# Patient Record
Sex: FEMALE | Race: WHITE | NOT HISPANIC OR LATINO | Employment: FULL TIME | URBAN - METROPOLITAN AREA
[De-identification: names, ages, dates, MRNs, and addresses within clinical notes are randomized per-mention and may not be internally consistent; named-entity substitution may affect disease eponyms.]

---

## 2017-04-13 ENCOUNTER — OFFICE VISIT (OUTPATIENT)
Dept: GASTROENTEROLOGY | Facility: CLINIC | Age: 52
End: 2017-04-13
Payer: COMMERCIAL

## 2017-04-13 VITALS
DIASTOLIC BLOOD PRESSURE: 100 MMHG | SYSTOLIC BLOOD PRESSURE: 160 MMHG | BODY MASS INDEX: 27.47 KG/M2 | HEIGHT: 61 IN | WEIGHT: 145.5 LBS | HEART RATE: 90 BPM

## 2017-04-13 DIAGNOSIS — K76.89 LIVER NODULE: Primary | ICD-10-CM

## 2017-04-13 DIAGNOSIS — R10.31 RLQ ABDOMINAL PAIN: ICD-10-CM

## 2017-04-13 PROCEDURE — 99999 PR PBB SHADOW E&M-EST. PATIENT-LVL III: CPT | Mod: PBBFAC,,, | Performed by: NURSE PRACTITIONER

## 2017-04-13 PROCEDURE — 1160F RVW MEDS BY RX/DR IN RCRD: CPT | Mod: S$GLB,,, | Performed by: NURSE PRACTITIONER

## 2017-04-13 PROCEDURE — 99204 OFFICE O/P NEW MOD 45 MIN: CPT | Mod: S$GLB,,, | Performed by: NURSE PRACTITIONER

## 2017-04-13 RX ORDER — OXYCODONE AND ACETAMINOPHEN 5; 325 MG/1; MG/1
1 TABLET ORAL EVERY 4 HOURS PRN
Status: ON HOLD | COMMUNITY
End: 2018-04-14

## 2017-04-13 RX ORDER — ESCITALOPRAM OXALATE 10 MG/1
10 TABLET ORAL DAILY
COMMUNITY

## 2017-04-13 RX ORDER — LEVOTHYROXINE SODIUM 100 UG/1
75 TABLET ORAL
COMMUNITY
Start: 2016-12-05 | End: 2018-05-08

## 2017-04-13 RX ORDER — CELECOXIB 200 MG/1
200 CAPSULE ORAL 2 TIMES DAILY
Status: ON HOLD | COMMUNITY
End: 2018-04-14 | Stop reason: HOSPADM

## 2017-04-13 RX ORDER — TRAMADOL HYDROCHLORIDE 50 MG/1
50 TABLET ORAL
Status: ON HOLD | COMMUNITY
End: 2018-04-14

## 2017-04-13 NOTE — PROGRESS NOTES
Clinic Consult:  [facility] Consultation Note    Reason for Consult:  The primary encounter diagnosis was Liver nodule. A diagnosis of RLQ abdominal pain was also pertinent to this visit.    PCP: Cesar Mccurdy       HPI:  This is a 51 y.o. female here for evaluation of the above  She was recntly evaluated by her PCP for RLQ abdominal pain.  She describes the pain ans sever, sharp pain that is constant throughout the day. No known exacerbating or reliving factors.    Recent workup included labd and imaging.  Records received and reviewed.   Labs are unremarkable with the exception of a slightly elevated bilirubin of 1.5  U/S of the abdomen showed Liver was normal in size and echogenicity.  No focal liver lesions or masses were seen.   A CT abdomen and pelvis were completed and found A small amount of free fluid in the pelvis.  There is a nonspecific 7mm hypodense subtle nodule in the lateral segment left hepatic lobe.   No previous hx of liver disease.  PMH includes breast CA 10 years ago which she has not followed up on in over 2 years.   She also states that she has a BM every few days, but denies any constipation.     Review of Systems   Constitutional: Negative for chills, fever, malaise/fatigue and weight loss.   Respiratory: Negative for cough.    Cardiovascular: Negative for chest pain.   Gastrointestinal:        Per HPI   Musculoskeletal: Negative for myalgias.   Skin: Negative for itching and rash.   Neurological: Negative for headaches.   Psychiatric/Behavioral: The patient is nervous/anxious.        Medical History:   Past Medical History:   Diagnosis Date    Breast cancer        Surgical History:  Past Surgical History:   Procedure Laterality Date    FOOT SURGERY      HYSTERECTOMY      KNEE SURGERY         Family History:   History reviewed. No pertinent family history.    Social History:   Social History   Substance Use Topics    Smoking status: Never Smoker    Smokeless tobacco:  "None    Alcohol use Yes       Allergies: Reviewed    Home Medications:   No current outpatient prescriptions on file prior to visit.     No current facility-administered medications on file prior to visit.        Physical Exam:  Vital Signs:  BP (!) 160/100  Pulse 90  Ht 5' 1" (1.549 m)  Wt 66 kg (145 lb 8.1 oz)  BMI 27.49 kg/m2  Body mass index is 27.49 kg/(m^2).  Physical Exam   Constitutional: She is oriented to person, place, and time. She appears well-developed and well-nourished.   HENT:   Head: Normocephalic.   Eyes: No scleral icterus.   Neck: Normal range of motion.   Cardiovascular: Normal rate and regular rhythm.    Pulmonary/Chest: Effort normal and breath sounds normal.   Abdominal: Soft. Bowel sounds are normal. She exhibits no distension and no mass. There is no tenderness. There is no rebound and no guarding.   Musculoskeletal: Normal range of motion.   Neurological: She is alert and oriented to person, place, and time.   Skin: Skin is warm and dry.   Psychiatric: She has a normal mood and affect.   Vitals reviewed.      Labs: Pertinent labs reviewed.  Last colonoscopy over 10 years ago.  Pt is refusing repeat to evaluate to RLQ abdominal pain further  Assessment:  1. Liver nodule    2. RLQ abdominal pain         Recommendations:  Liver nodule  - Likely cystic in nature, however, will get triple phase CT to further evaluate the area.   - No previous liver disease and labs are WNL  - Not likely the cause of the abdominal pain  - Will have CT reviewed by Dr. Veloz once complete  -     CT Abdomen With Without Contrast; Future; Expected date: 4/13/17    RLQ abdominal pain  - Pt was not willing to have the RLQ discussed or further evaluated at this time.  She became quite defensive at suggestions of possible constipation vs possible colon spasms vs other etiologies, including malignancy within the colon which would need to be evaluated by colonoscopy.     - Offered a prescription for Bentyl or Levsin " to the pt for possible colon spasms, but pt adamantly refused.       Follow up to be determined by results of procedure.        Thank you so much for allowing me to participate in the care of KRISHNA Martinez

## 2017-04-13 NOTE — MR AVS SNAPSHOT
[facility]  [address] [place] [address]  Phone: [phone]  Fax: [phone]                  Diane Laughlin   2017 9:40 AM   Office Visit    Description:  Female : 1965   Provider:  BRYANT Connelly   Department:  [facility]           Reason for Visit     Other           Diagnoses this Visit        Comments    Liver nodule    -  Primary            To Do List           Future Appointments        Provider Department Dept Phone    2017 9:40 AM BRYANT Connelly [facility] [phone]      Goals (5 Years of Data)     None      [facility]     [facility] Care Line -  Assistance  Unless otherwise directed by your provider, please contact [facility], our nurse care line that is available for  assistance.     Registered nurses in the [facility] provide: appointment scheduling, clinical advisement, health education, and other advisory services.  Call: [phone] (toll free)               Medications           Message regarding Medications     Verify the changes and/or additions to your medication regime listed below are the same as discussed with your clinician today.  If any of these changes or additions are incorrect, please notify your healthcare provider.             Verify that the below list of medications is an accurate representation of the medications you are currently taking.  If none reported, the list may be blank. If incorrect, please contact your healthcare provider. Carry this list with you in case of emergency.           Current Medications     celecoxib (CELEBREX) 200 MG capsule Take 200 mg by mouth 2 (two) times daily.    escitalopram oxalate (LEXAPRO) 10 MG tablet Take 10 mg by mouth once daily.    levothyroxine (SYNTHROID) 100 MCG tablet Take 100 mcg by mouth.    oxycodone-acetaminophen (PERCOCET) 5-325 mg per tablet Take 1 tablet by mouth every 4 (four) hours as needed for  "Pain.    tramadol (ULTRAM) 50 mg tablet Take 50 mg by mouth.           Clinical Reference Information           Your Vitals Were     BP Pulse Height Weight BMI    160/100 90 5' 1" (1.549 m) 66 kg (145 lb 8.1 oz) 27.49 kg/m2      Blood Pressure          Most Recent Value    BP  (!)  160/100      Allergies as of 4/13/2017     No Known Allergies      Immunizations Administered on Date of Encounter - 4/13/2017     None      Orders Placed During Today's Visit     Future Labs/Procedures Expected by Expires    CT Abdomen With Without Contrast  4/13/2017 4/14/2018      [facility] Sign-Up     Activating your [facility] account is as easy as 1-2-3!     1) Visit [email or web address], select Sign Up Now, enter this activation code and your date of birth, then select Next.  [email or web address]  Expires: 5/28/2017  9:28 AM      2) Create a username and password to use when you visit [facility] in the future and select a security question in case you lose your password and select Next.    3) Enter your e-mail address and click Sign Up!    Additional Information  If you have questions, please e-mail [email or web address] or call [phone] to talk to our [facility] staff. Remember, [facility] is NOT to be used for urgent needs. For medical emergencies, dial 911.         Language Assistance Services     ATTENTION: Language assistance services are available, free of charge. Please call [phone].      ATENCIÓN: Si habla español, tiene a flowers disposición servicios gratuitos de asistencia lingüística. Llame al [phone].     CHÚ Ý: N?u b?n nói Ti?ng Vi?t, có các d?ch v? h? tr? ngôn ng? mi?n phí dành cho b?n. G?i s? [phone].         [facility] complies with applicable Federal civil rights laws and does not discriminate on the basis of race, color, national origin, age, disability, or sex.        "

## 2018-04-13 ENCOUNTER — HOSPITAL ENCOUNTER (OUTPATIENT)
Facility: HOSPITAL | Age: 53
Discharge: HOME | End: 2018-04-14
Attending: EMERGENCY MEDICINE | Admitting: EMERGENCY MEDICINE
Payer: COMMERCIAL

## 2018-04-13 DIAGNOSIS — K92.1 BLOODY STOOLS: ICD-10-CM

## 2018-04-13 DIAGNOSIS — K52.9 COLITIS: Primary | ICD-10-CM

## 2018-04-13 DIAGNOSIS — R55 SYNCOPE: ICD-10-CM

## 2018-04-13 DIAGNOSIS — E05.00 GRAVES DISEASE: ICD-10-CM

## 2018-04-13 DIAGNOSIS — R55 SYNCOPE, UNSPECIFIED SYNCOPE TYPE: ICD-10-CM

## 2018-04-13 DIAGNOSIS — R55 FAINTING SPELL: ICD-10-CM

## 2018-04-13 DIAGNOSIS — R55 FAINTING EPISODES: ICD-10-CM

## 2018-04-13 PROBLEM — I10 HYPERTENSION: Status: ACTIVE | Noted: 2018-04-13

## 2018-04-13 LAB
ABO + RH BLD: NORMAL
ALBUMIN SERPL BCP-MCNC: 4.4 G/DL
ALP SERPL-CCNC: 77 U/L
ALT SERPL W/O P-5'-P-CCNC: 26 U/L
ANION GAP SERPL CALC-SCNC: 14 MMOL/L
APTT BLDCRRT: 22.8 SEC
AST SERPL-CCNC: 30 U/L
BASOPHILS # BLD AUTO: 0 K/UL
BASOPHILS # BLD AUTO: 0.01 K/UL
BASOPHILS # BLD AUTO: 0.01 K/UL
BASOPHILS NFR BLD: 0 %
BASOPHILS NFR BLD: 0.1 %
BASOPHILS NFR BLD: 0.1 %
BILIRUB SERPL-MCNC: 1 MG/DL
BLD GP AB SCN CELLS X3 SERPL QL: NORMAL
BUN SERPL-MCNC: 15 MG/DL
CALCIUM SERPL-MCNC: 10.3 MG/DL
CHLORIDE SERPL-SCNC: 98 MMOL/L
CO2 SERPL-SCNC: 24 MMOL/L
CREAT SERPL-MCNC: 1.1 MG/DL
DIASTOLIC DYSFUNCTION: NO
DIFFERENTIAL METHOD: ABNORMAL
EOSINOPHIL # BLD AUTO: 0 K/UL
EOSINOPHIL NFR BLD: 0.1 %
EOSINOPHIL NFR BLD: 0.1 %
EOSINOPHIL NFR BLD: 0.2 %
ERYTHROCYTE [DISTWIDTH] IN BLOOD BY AUTOMATED COUNT: 12.2 %
ERYTHROCYTE [DISTWIDTH] IN BLOOD BY AUTOMATED COUNT: 12.3 %
ERYTHROCYTE [DISTWIDTH] IN BLOOD BY AUTOMATED COUNT: 12.7 %
EST. GFR  (AFRICAN AMERICAN): >60 ML/MIN/1.73 M^2
EST. GFR  (NON AFRICAN AMERICAN): 58 ML/MIN/1.73 M^2
GLUCOSE SERPL-MCNC: 167 MG/DL
HCT VFR BLD AUTO: 32.3 %
HCT VFR BLD AUTO: 36.6 %
HCT VFR BLD AUTO: 38.1 %
HCT VFR BLD AUTO: 38.3 %
HGB BLD-MCNC: 10.1 G/DL
HGB BLD-MCNC: 12.6 G/DL
HGB BLD-MCNC: 13.5 G/DL
HGB BLD-MCNC: 13.6 G/DL
INR PPP: 1
LACTATE SERPL-SCNC: 1.8 MMOL/L
LACTATE SERPL-SCNC: 2.6 MMOL/L
LYMPHOCYTES # BLD AUTO: 0.7 K/UL
LYMPHOCYTES # BLD AUTO: 1.6 K/UL
LYMPHOCYTES # BLD AUTO: 1.6 K/UL
LYMPHOCYTES NFR BLD: 12.7 %
LYMPHOCYTES NFR BLD: 18.4 %
LYMPHOCYTES NFR BLD: 7.8 %
MCH RBC QN AUTO: 30.1 PG
MCH RBC QN AUTO: 31.3 PG
MCH RBC QN AUTO: 31.5 PG
MCHC RBC AUTO-ENTMCNC: 31.3 G/DL
MCHC RBC AUTO-ENTMCNC: 35.4 G/DL
MCHC RBC AUTO-ENTMCNC: 35.5 G/DL
MCV RBC AUTO: 88 FL
MCV RBC AUTO: 89 FL
MCV RBC AUTO: 96 FL
MONOCYTES # BLD AUTO: 0.1 K/UL
MONOCYTES # BLD AUTO: 0.4 K/UL
MONOCYTES # BLD AUTO: 0.5 K/UL
MONOCYTES NFR BLD: 1.3 %
MONOCYTES NFR BLD: 4 %
MONOCYTES NFR BLD: 5.2 %
NEUTROPHILS # BLD AUTO: 10.6 K/UL
NEUTROPHILS # BLD AUTO: 6.4 K/UL
NEUTROPHILS # BLD AUTO: 8.5 K/UL
NEUTROPHILS NFR BLD: 76.2 %
NEUTROPHILS NFR BLD: 83.1 %
NEUTROPHILS NFR BLD: 90.7 %
PLATELET # BLD AUTO: 180 K/UL
PLATELET # BLD AUTO: 240 K/UL
PLATELET # BLD AUTO: 241 K/UL
PMV BLD AUTO: 10 FL
PMV BLD AUTO: 10 FL
PMV BLD AUTO: 9.8 FL
POTASSIUM SERPL-SCNC: 3.8 MMOL/L
PROT SERPL-MCNC: 7.5 G/DL
PROTHROMBIN TIME: 10 SEC
RBC # BLD AUTO: 3.35 M/UL
RBC # BLD AUTO: 4.31 M/UL
RBC # BLD AUTO: 4.32 M/UL
RETIRED EF AND QEF - SEE NOTES: 60 (ref 55–65)
SODIUM SERPL-SCNC: 136 MMOL/L
TROPONIN I SERPL DL<=0.01 NG/ML-MCNC: <0.006 NG/ML
WBC # BLD AUTO: 12.75 K/UL
WBC # BLD AUTO: 8.42 K/UL
WBC # BLD AUTO: 9.39 K/UL

## 2018-04-13 PROCEDURE — 83605 ASSAY OF LACTIC ACID: CPT

## 2018-04-13 PROCEDURE — 63600175 PHARM REV CODE 636 W HCPCS: Performed by: EMERGENCY MEDICINE

## 2018-04-13 PROCEDURE — G0378 HOSPITAL OBSERVATION PER HR: HCPCS

## 2018-04-13 PROCEDURE — 84484 ASSAY OF TROPONIN QUANT: CPT

## 2018-04-13 PROCEDURE — 93306 TTE W/DOPPLER COMPLETE: CPT | Mod: 26,,, | Performed by: INTERNAL MEDICINE

## 2018-04-13 PROCEDURE — 25000003 PHARM REV CODE 250: Performed by: EMERGENCY MEDICINE

## 2018-04-13 PROCEDURE — 85014 HEMATOCRIT: CPT | Mod: 91

## 2018-04-13 PROCEDURE — 83605 ASSAY OF LACTIC ACID: CPT | Mod: 91

## 2018-04-13 PROCEDURE — 85018 HEMOGLOBIN: CPT | Mod: 91

## 2018-04-13 PROCEDURE — 25500020 PHARM REV CODE 255: Performed by: EMERGENCY MEDICINE

## 2018-04-13 PROCEDURE — 80053 COMPREHEN METABOLIC PANEL: CPT

## 2018-04-13 PROCEDURE — 96366 THER/PROPH/DIAG IV INF ADDON: CPT

## 2018-04-13 PROCEDURE — 96375 TX/PRO/DX INJ NEW DRUG ADDON: CPT

## 2018-04-13 PROCEDURE — 96365 THER/PROPH/DIAG IV INF INIT: CPT

## 2018-04-13 PROCEDURE — 96367 TX/PROPH/DG ADDL SEQ IV INF: CPT

## 2018-04-13 PROCEDURE — 25000003 PHARM REV CODE 250: Performed by: PHYSICIAN ASSISTANT

## 2018-04-13 PROCEDURE — 86901 BLOOD TYPING SEROLOGIC RH(D): CPT

## 2018-04-13 PROCEDURE — 85730 THROMBOPLASTIN TIME PARTIAL: CPT

## 2018-04-13 PROCEDURE — 93306 TTE W/DOPPLER COMPLETE: CPT

## 2018-04-13 PROCEDURE — 96376 TX/PRO/DX INJ SAME DRUG ADON: CPT | Mod: 59

## 2018-04-13 PROCEDURE — 36415 COLL VENOUS BLD VENIPUNCTURE: CPT

## 2018-04-13 PROCEDURE — 93010 ELECTROCARDIOGRAM REPORT: CPT | Mod: ,,, | Performed by: INTERNAL MEDICINE

## 2018-04-13 PROCEDURE — 63600175 PHARM REV CODE 636 W HCPCS: Performed by: PHYSICIAN ASSISTANT

## 2018-04-13 PROCEDURE — 99285 EMERGENCY DEPT VISIT HI MDM: CPT | Mod: 25

## 2018-04-13 PROCEDURE — S0030 INJECTION, METRONIDAZOLE: HCPCS | Performed by: EMERGENCY MEDICINE

## 2018-04-13 PROCEDURE — 85025 COMPLETE CBC W/AUTO DIFF WBC: CPT | Mod: 91

## 2018-04-13 PROCEDURE — C9113 INJ PANTOPRAZOLE SODIUM, VIA: HCPCS | Performed by: EMERGENCY MEDICINE

## 2018-04-13 PROCEDURE — 85610 PROTHROMBIN TIME: CPT

## 2018-04-13 RX ORDER — MORPHINE SULFATE 4 MG/ML
4 INJECTION, SOLUTION INTRAMUSCULAR; INTRAVENOUS
Status: COMPLETED | OUTPATIENT
Start: 2018-04-13 | End: 2018-04-13

## 2018-04-13 RX ORDER — CIPROFLOXACIN 2 MG/ML
400 INJECTION, SOLUTION INTRAVENOUS
Status: DISCONTINUED | OUTPATIENT
Start: 2018-04-13 | End: 2018-04-14 | Stop reason: HOSPADM

## 2018-04-13 RX ORDER — METRONIDAZOLE 500 MG/100ML
500 INJECTION, SOLUTION INTRAVENOUS
Status: COMPLETED | OUTPATIENT
Start: 2018-04-13 | End: 2018-04-13

## 2018-04-13 RX ORDER — ONDANSETRON 2 MG/ML
4 INJECTION INTRAMUSCULAR; INTRAVENOUS
Status: COMPLETED | OUTPATIENT
Start: 2018-04-13 | End: 2018-04-13

## 2018-04-13 RX ORDER — DEXTROSE MONOHYDRATE AND SODIUM CHLORIDE 5; .9 G/100ML; G/100ML
INJECTION, SOLUTION INTRAVENOUS CONTINUOUS
Status: DISCONTINUED | OUTPATIENT
Start: 2018-04-13 | End: 2018-04-14

## 2018-04-13 RX ORDER — CIPROFLOXACIN 2 MG/ML
400 INJECTION, SOLUTION INTRAVENOUS
Status: DISCONTINUED | OUTPATIENT
Start: 2018-04-13 | End: 2018-04-13 | Stop reason: SDUPTHER

## 2018-04-13 RX ORDER — PANTOPRAZOLE SODIUM 40 MG/1
40 TABLET, DELAYED RELEASE ORAL DAILY
Status: DISCONTINUED | OUTPATIENT
Start: 2018-04-14 | End: 2018-04-14 | Stop reason: HOSPADM

## 2018-04-13 RX ORDER — ONDANSETRON 2 MG/ML
4 INJECTION INTRAMUSCULAR; INTRAVENOUS EVERY 8 HOURS PRN
Status: DISCONTINUED | OUTPATIENT
Start: 2018-04-13 | End: 2018-04-14 | Stop reason: HOSPADM

## 2018-04-13 RX ORDER — METRONIDAZOLE 500 MG/100ML
500 INJECTION, SOLUTION INTRAVENOUS
Status: DISCONTINUED | OUTPATIENT
Start: 2018-04-13 | End: 2018-04-14 | Stop reason: HOSPADM

## 2018-04-13 RX ORDER — LEVOTHYROXINE SODIUM 100 UG/1
100 TABLET ORAL
Status: DISCONTINUED | OUTPATIENT
Start: 2018-04-14 | End: 2018-04-14 | Stop reason: HOSPADM

## 2018-04-13 RX ORDER — MORPHINE SULFATE 4 MG/ML
4 INJECTION, SOLUTION INTRAMUSCULAR; INTRAVENOUS EVERY 4 HOURS PRN
Status: DISCONTINUED | OUTPATIENT
Start: 2018-04-13 | End: 2018-04-13

## 2018-04-13 RX ORDER — HYDRALAZINE HYDROCHLORIDE 20 MG/ML
10 INJECTION INTRAMUSCULAR; INTRAVENOUS EVERY 6 HOURS PRN
Status: DISCONTINUED | OUTPATIENT
Start: 2018-04-13 | End: 2018-04-14 | Stop reason: HOSPADM

## 2018-04-13 RX ORDER — MORPHINE SULFATE 4 MG/ML
2 INJECTION, SOLUTION INTRAMUSCULAR; INTRAVENOUS EVERY 4 HOURS PRN
Status: DISCONTINUED | OUTPATIENT
Start: 2018-04-13 | End: 2018-04-13

## 2018-04-13 RX ORDER — ESCITALOPRAM OXALATE 10 MG/1
10 TABLET ORAL DAILY
Status: DISCONTINUED | OUTPATIENT
Start: 2018-04-13 | End: 2018-04-14 | Stop reason: HOSPADM

## 2018-04-13 RX ORDER — MORPHINE SULFATE 4 MG/ML
2 INJECTION, SOLUTION INTRAMUSCULAR; INTRAVENOUS EVERY 4 HOURS PRN
Status: DISCONTINUED | OUTPATIENT
Start: 2018-04-13 | End: 2018-04-14 | Stop reason: HOSPADM

## 2018-04-13 RX ORDER — ESCITALOPRAM OXALATE 10 MG/1
10 TABLET ORAL DAILY
Status: DISCONTINUED | OUTPATIENT
Start: 2018-04-13 | End: 2018-04-13

## 2018-04-13 RX ORDER — OXYCODONE AND ACETAMINOPHEN 5; 325 MG/1; MG/1
1 TABLET ORAL EVERY 4 HOURS PRN
Status: DISCONTINUED | OUTPATIENT
Start: 2018-04-13 | End: 2018-04-14 | Stop reason: HOSPADM

## 2018-04-13 RX ADMIN — MORPHINE SULFATE 4 MG: 4 INJECTION INTRAVENOUS at 09:04

## 2018-04-13 RX ADMIN — CIPROFLOXACIN 400 MG: 2 INJECTION, SOLUTION INTRAVENOUS at 12:04

## 2018-04-13 RX ADMIN — ESCITALOPRAM OXALATE 10 MG: 10 TABLET, FILM COATED ORAL at 03:04

## 2018-04-13 RX ADMIN — CIPROFLOXACIN 400 MG: 2 INJECTION, SOLUTION INTRAVENOUS at 10:04

## 2018-04-13 RX ADMIN — MORPHINE SULFATE 2 MG: 4 INJECTION INTRAVENOUS at 06:04

## 2018-04-13 RX ADMIN — PANTOPRAZOLE SODIUM 40 MG: 40 INJECTION, POWDER, FOR SOLUTION INTRAVENOUS at 09:04

## 2018-04-13 RX ADMIN — ONDANSETRON 4 MG: 2 INJECTION INTRAMUSCULAR; INTRAVENOUS at 09:04

## 2018-04-13 RX ADMIN — OXYCODONE HYDROCHLORIDE AND ACETAMINOPHEN 1 TABLET: 5; 325 TABLET ORAL at 04:04

## 2018-04-13 RX ADMIN — MORPHINE SULFATE 2 MG: 4 INJECTION INTRAVENOUS at 10:04

## 2018-04-13 RX ADMIN — METRONIDAZOLE 500 MG: 500 INJECTION, SOLUTION INTRAVENOUS at 06:04

## 2018-04-13 RX ADMIN — DEXTROSE AND SODIUM CHLORIDE: 5; .9 INJECTION, SOLUTION INTRAVENOUS at 12:04

## 2018-04-13 RX ADMIN — MORPHINE SULFATE 2 MG: 4 INJECTION INTRAVENOUS at 12:04

## 2018-04-13 RX ADMIN — IOHEXOL 75 ML: 350 INJECTION, SOLUTION INTRAVENOUS at 09:04

## 2018-04-13 RX ADMIN — METRONIDAZOLE 500 MG: 500 INJECTION, SOLUTION INTRAVENOUS at 10:04

## 2018-04-13 NOTE — HPI
Diane Laughlin is a 52 year old female with a history of breast cancer and hypertension who presented to the ED with complaints right sided abdominal pain, diarrhea and bright red blood per rectum that began last night. Additionally, the patient reports having several near syncopal episodes and one episode of syncope while having a bowel movement last night. She states that her right sided abdominal pain is sharp, located in her right lower quadrant and currently rates a 5/10 on a pain scale. She describes  Her diarrhea as initially watery with bright red blood streaks, but later completely red. She has not had a bowel movement since being in the ED. The patient reports that for many months, she has noted occasional bright red blood with bowel movements, but this is different. Her episode of syncope occurred last night while she was having a bowel movement. She reports straining, then being awakened by her dog barking and her daughter coming in the bathroom. She denies prior syncope. She further denies fever, chills, nausea, vomiting and chest pain. The patient reports that her full family history is unknown, but to her knowledge, she does not have a family history of inflammatory bowel disease. In the ED, the patient's CT scan of the abdomen was significant for thickening and pericolonic inflammatory changes are seen throughout the transverse and descending colon. Her WBC count was 9,370. Her hemoglobin and hematocrit were 13.5 and 38.1, respectively. The patient's metabolic panel was unremarkable.

## 2018-04-13 NOTE — ASSESSMENT & PLAN NOTE
-Patient is not on home medications and reports that blood pressure elevation is exacerbated by pain.   -Hydralazine as needed.

## 2018-04-13 NOTE — ED NOTES
"Level of Consciousness: The patient is awake, alert, and oriented with appropriate affect and speech; oriented to person, place and time.  Appearance: Sitting up in bed with no acute distress noted. Clothing and hygiene are clean and worn appropriately.  Skin: Skin is warm and dry with good skin turgor; intact; color consistent with ethnicity.  Mucous membranes are moist.   Musculoskeletal: Moves all extremities well in full range of motion. No obvious deformities or swelling noted.  Respiratory: Airway open and patent, respirations spontaneous, even and unlabored. No accessory muscles in use.   Cardiac: Regular rate and rhythm, good pulses palpated peripherally, capillary refill < 3 seconds.  Abdomen: Soft, non-tender to palpation. No distention noted.  Bruise to right side of abdomen. Pt states RLQ pain with bloody stool since yesterday. Pt states she almost "passed out". Denies LOC. Denies N/V.   Neurologic: PERRLA, face exhibits symmetrical expression, hand grasps equal and even bilaterally, normal sensation noted to all extremities and face when touched by a finger.          "

## 2018-04-13 NOTE — ASSESSMENT & PLAN NOTE
-Likely due to a vasovagal episode.   -Check serial cardiac enzymes, 2D echo, carotid US and MRI of brain as CT scan cannot be performed due to recent IV contrast administration.   -Telemetry monitoring.   -Neurochecks.

## 2018-04-13 NOTE — H&P
[facility] Hospital Medicine  History & Physical    Patient Name: Diane Laughlin  MRN: [id number]  Admission Date: 4/13/2018  Attending Physician: Jf Baeza Jr., MD   Primary Care Provider: Cesar Mccurdy MD         Patient information was obtained from patient, past medical records and ER records.     Subjective:     Principal Problem:Syncope    Chief Complaint:   Chief Complaint   Patient presents with    Melena     reports bright/dark red stools since yesterday, near syncope today. RLQ pain radiates to lower back.         HPI: Diane Laughlin is a 52 year old female with a history of breast cancer and hypertension who presented to the ED with complaints right sided abdominal pain, diarrhea and bright red blood per rectum that began last night. Additionally, the patient reports having several near syncopal episodes and one episode of syncope while having a bowel movement last night. She states that her right sided abdominal pain is sharp, located in her right lower quadrant and currently rates a 5/10 on a pain scale. She describes  Her diarrhea as initially watery with bright red blood streaks, but later completely red. She has not had a bowel movement since being in the ED. The patient reports that for many months, she has noted occasional bright red blood with bowel movements, but this is different. Her episode of syncope occurred last night while she was having a bowel movement. She reports straining, then being awakened by her dog barking and her daughter coming in the bathroom. She denies prior syncope. She further denies fever, chills, nausea, vomiting and chest pain. The patient reports that her full family history is unknown, but to her knowledge, she does not have a family history of inflammatory bowel disease. In the ED, the patient's CT scan of the abdomen was significant for thickening and pericolonic inflammatory changes are seen throughout the transverse and descending colon. Her  "WBC count was 9,370. Her hemoglobin and hematocrit were 13.5 and 38.1, respectively. The patient's metabolic panel was unremarkable.     Past Medical History:   Diagnosis Date    Breast cancer     Chronic back pain     Graves disease     Hypertension        Past Surgical History:   Procedure Laterality Date     SECTION      with complication requiring large midline incision    FOOT SURGERY      HYSTERECTOMY      KNEE SURGERY Left     x 6       Review of patient's allergies indicates:   Allergen Reactions    Unclassified drug      "a chemo medicine'       No current facility-administered medications on file prior to encounter.      Current Outpatient Prescriptions on File Prior to Encounter   Medication Sig    tramadol (ULTRAM) 50 mg tablet Take 50 mg by mouth.    celecoxib (CELEBREX) 200 MG capsule Take 200 mg by mouth 2 (two) times daily.    escitalopram oxalate (LEXAPRO) 10 MG tablet Take 10 mg by mouth once daily.    levothyroxine (SYNTHROID) 100 MCG tablet Take 100 mcg by mouth.    oxycodone-acetaminophen (PERCOCET) 5-325 mg per tablet Take 1 tablet by mouth every 4 (four) hours as needed for Pain.     Family History     Reviewed and not pertinent.         Social History Main Topics    Smoking status: Never Smoker    Smokeless tobacco: Not on file    Alcohol use No    Drug use: No    Sexual activity: Not on file     Review of Systems   Constitutional: Negative for appetite change, chills, diaphoresis, fatigue and fever.   HENT: Negative for congestion, ear pain, mouth sores, sore throat and trouble swallowing.    Eyes: Negative for pain and visual disturbance.   Respiratory: Negative for cough, chest tightness and shortness of breath.    Cardiovascular: Negative for chest pain, palpitations and leg swelling.   Gastrointestinal: Positive for abdominal pain, blood in stool and diarrhea. Negative for constipation and nausea.   Endocrine: Negative for cold intolerance, heat intolerance, " polydipsia and polyuria.   Genitourinary: Negative for dysuria, frequency and hematuria.   Musculoskeletal: Negative for arthralgias, back pain, myalgias and neck pain.   Skin: Negative for pallor, rash and wound.   Allergic/Immunologic: Negative for environmental allergies and immunocompromised state.   Neurological: Negative for dizziness, seizures, syncope, weakness, numbness and headaches.   Hematological: Negative for adenopathy. Does not bruise/bleed easily.   Psychiatric/Behavioral: Negative for agitation, confusion and sleep disturbance.     Objective:     Vital Signs (Most Recent):  Temp: 98.5 °F (36.9 °C) (04/13/18 0758)  Pulse: 87 (04/13/18 0758)  Resp: 18 (04/13/18 0758)  BP: (!) 181/104 (04/13/18 0758)  SpO2: 100 % (04/13/18 0758) Vital Signs (24h Range):  Temp:  [98.5 °F (36.9 °C)] 98.5 °F (36.9 °C)  Pulse:  [87] 87  Resp:  [18] 18  SpO2:  [100 %] 100 %  BP: (181)/(104) 181/104     Weight: 74.1 kg (163 lb 5.8 oz)  Body mass index is 30.87 kg/m².    Physical Exam   Constitutional: She is oriented to person, place, and time. She appears well-developed and well-nourished. No distress.   HENT:   Head: Normocephalic and atraumatic.   Eyes: Conjunctivae are normal.   PERRL   Neck: Neck supple. No JVD present.   Cardiovascular: Normal rate, regular rhythm and normal heart sounds.    Pulmonary/Chest: Effort normal and breath sounds normal.   Abdominal: Soft. Bowel sounds are normal. She exhibits no distension. There is tenderness (right lower quadrant).   Musculoskeletal: Normal range of motion.   Neurological: She is alert and oriented to person, place, and time.   Skin: Skin is warm and dry.   Psychiatric: Her behavior is normal. Thought content normal. Her mood appears anxious.   Nursing note and vitals reviewed.          Significant Labs:   CBC:   Recent Labs  Lab 04/13/18  0832   WBC 9.39   HGB 13.5   HCT 38.1        CMP:   Recent Labs  Lab 04/13/18  0832      K 3.8   CL 98   CO2 24   GLU  167*   BUN 15   CREATININE 1.1   CALCIUM 10.3   PROT 7.5   ALBUMIN 4.4   BILITOT 1.0   ALKPHOS 77   AST 30   ALT 26   ANIONGAP 14   EGFRNONAA 58*     Lactic Acid:   Recent Labs  Lab 04/13/18  1102   LACTATE 2.6*     All pertinent labs within the past 24 hours have been reviewed.    Significant Imaging: I have reviewed all pertinent imaging results/findings within the past 24 hours.    Assessment/Plan:     * Syncope    -Likely due to a vasovagal episode.   -Check serial cardiac enzymes, 2D echo, carotid US and MRI of brain as CT scan cannot be performed due to recent IV contrast administration.   -Telemetry monitoring.   -Neurochecks.             Colitis    -Differential includes infectious, inflammatory and less likely ischemic etiology.   -Trend lactic acid.   -IV fluids.   -Cipro and Flagyl.   -Symptomatic care.           Blood in stool    -Associated with colitis.   -Monitor hemoglobin and hematocrit.           Graves disease    Continue Synthroid.           Hypertension    -Patient is not on home medications and reports that blood pressure elevation is exacerbated by pain.   -Hydralazine as needed.             VTE Risk Mitigation         Ordered     IP VTE HIGH RISK PATIENT  Once      04/13/18 1052     Place ANALI hose  Until discontinued      04/13/18 1052     Place sequential compression device  Until discontinued      04/13/18 1052     Reason for No Pharmacological VTE Prophylaxis  Once      04/13/18 1052             MILO Scott  Department of Hospital Medicine   [facility]

## 2018-04-13 NOTE — SUBJECTIVE & OBJECTIVE
"Past Medical History:   Diagnosis Date    Breast cancer     Chronic back pain     Graves disease     Hypertension        Past Surgical History:   Procedure Laterality Date     SECTION      with complication requiring large midline incision    FOOT SURGERY      HYSTERECTOMY      KNEE SURGERY Left     x 6       Review of patient's allergies indicates:   Allergen Reactions    Unclassified drug      "a chemo medicine'       No current facility-administered medications on file prior to encounter.      Current Outpatient Prescriptions on File Prior to Encounter   Medication Sig    tramadol (ULTRAM) 50 mg tablet Take 50 mg by mouth.    celecoxib (CELEBREX) 200 MG capsule Take 200 mg by mouth 2 (two) times daily.    escitalopram oxalate (LEXAPRO) 10 MG tablet Take 10 mg by mouth once daily.    levothyroxine (SYNTHROID) 100 MCG tablet Take 100 mcg by mouth.    oxycodone-acetaminophen (PERCOCET) 5-325 mg per tablet Take 1 tablet by mouth every 4 (four) hours as needed for Pain.     Family History     None        Social History Main Topics    Smoking status: Never Smoker    Smokeless tobacco: Not on file    Alcohol use No    Drug use: No    Sexual activity: Not on file     Review of Systems   Constitutional: Negative for appetite change, chills, diaphoresis, fatigue and fever.   HENT: Negative for congestion, ear pain, mouth sores, sore throat and trouble swallowing.    Eyes: Negative for pain and visual disturbance.   Respiratory: Negative for cough, chest tightness and shortness of breath.    Cardiovascular: Negative for chest pain, palpitations and leg swelling.   Gastrointestinal: Positive for abdominal pain, blood in stool and diarrhea. Negative for constipation and nausea.   Endocrine: Negative for cold intolerance, heat intolerance, polydipsia and polyuria.   Genitourinary: Negative for dysuria, frequency and hematuria.   Musculoskeletal: Negative for arthralgias, back pain, myalgias and neck " pain.   Skin: Negative for pallor, rash and wound.   Allergic/Immunologic: Negative for environmental allergies and immunocompromised state.   Neurological: Negative for dizziness, seizures, syncope, weakness, numbness and headaches.   Hematological: Negative for adenopathy. Does not bruise/bleed easily.   Psychiatric/Behavioral: Negative for agitation, confusion and sleep disturbance.     Objective:     Vital Signs (Most Recent):  Temp: 98.5 °F (36.9 °C) (04/13/18 0758)  Pulse: 87 (04/13/18 0758)  Resp: 18 (04/13/18 0758)  BP: (!) 181/104 (04/13/18 0758)  SpO2: 100 % (04/13/18 0758) Vital Signs (24h Range):  Temp:  [98.5 °F (36.9 °C)] 98.5 °F (36.9 °C)  Pulse:  [87] 87  Resp:  [18] 18  SpO2:  [100 %] 100 %  BP: (181)/(104) 181/104     Weight: 74.1 kg (163 lb 5.8 oz)  Body mass index is 30.87 kg/m².    Physical Exam   Constitutional: She is oriented to person, place, and time. She appears well-developed and well-nourished. No distress.   HENT:   Head: Normocephalic and atraumatic.   Eyes: Conjunctivae are normal.   PERRL   Neck: Neck supple. No JVD present.   Cardiovascular: Normal rate, regular rhythm and normal heart sounds.    Pulmonary/Chest: Effort normal and breath sounds normal.   Abdominal: Soft. Bowel sounds are normal. She exhibits no distension. There is tenderness (right lower quadrant).   Musculoskeletal: Normal range of motion.   Neurological: She is alert and oriented to person, place, and time.   Skin: Skin is warm and dry.   Psychiatric: Her behavior is normal. Thought content normal. Her mood appears anxious.   Nursing note and vitals reviewed.          Significant Labs:   CBC:   Recent Labs  Lab 04/13/18  0832   WBC 9.39   HGB 13.5   HCT 38.1        CMP:   Recent Labs  Lab 04/13/18  0832      K 3.8   CL 98   CO2 24   *   BUN 15   CREATININE 1.1   CALCIUM 10.3   PROT 7.5   ALBUMIN 4.4   BILITOT 1.0   ALKPHOS 77   AST 30   ALT 26   ANIONGAP 14   EGFRNONAA 58*     Lactic Acid:    Recent Labs  Lab 04/13/18  1102   LACTATE 2.6*     All pertinent labs within the past 24 hours have been reviewed.    Significant Imaging: I have reviewed all pertinent imaging results/findings within the past 24 hours.

## 2018-04-13 NOTE — ASSESSMENT & PLAN NOTE
-Differential includes infectious, inflammatory and less likely ischemic etiology.   -Trend lactic acid.   -IV fluids.   -Cipro and Flagyl.   -Symptomatic care.

## 2018-04-13 NOTE — ED PROVIDER NOTES
"SCRIBE #1 NOTE: I, Sivakumar Harvey, am scribing for, and in the presence of, Jf Baeza Jr., MD. I have scribed the entire note.      History      Chief Complaint   Patient presents with    Melena     reports bright/dark red stools since yesterday, near syncope today. RLQ pain radiates to lower back.        Review of patient's allergies indicates:   Allergen Reactions    Unclassified drug      "a chemo medicine'        HPI   HPI    2018, 8:09 AM   History obtained from the patient      History of Present Illness: Diane Laughlin is a 52 y.o. female patient who presents to the Emergency Department for rectal bleeding which onset gradually last night. Sxs are episodic and moderate in severity. Pt reports passing dark red blood in stool and having bleeding without stool. Pt states that she passed out last night. There are no mitigating or exacerbating factors noted. Associated sxs include RLQ ABD pain, nausea, diaphoresis.  Pt denies any fever, chills, hematuria, vaginal bleeding, CP, emesis, diarrhea, constipation, and all other sxs at this time. No further complaints or concerns at this time.         Arrival mode: Personal vehicle     PCP: Cesar Mccurdy MD       Past Medical History:  Past Medical History:   Diagnosis Date    Breast cancer     Chronic back pain     Graves disease     Hypertension        Past Surgical History:  Past Surgical History:   Procedure Laterality Date     SECTION      with complication requiring large midline incision    FOOT SURGERY      HYSTERECTOMY      KNEE SURGERY Left     x 6         Family History:  History reviewed. No pertinent family history.    Social History:  Social History     Social History Main Topics    Smoking status: Never Smoker    Smokeless tobacco: unknown    Alcohol use Yes    Drug use: Unknown    Sexual activity: unknown       ROS   Review of Systems   Constitutional: Positive for diaphoresis. Negative for fever.   HENT: Negative " for sore throat.    Respiratory: Negative for shortness of breath.    Cardiovascular: Negative for chest pain.   Gastrointestinal: Positive for abdominal pain (RLQ), anal bleeding, blood in stool and nausea. Negative for constipation, diarrhea and vomiting.   Genitourinary: Negative for dysuria, hematuria, vaginal bleeding and vaginal discharge.   Musculoskeletal: Negative for back pain.   Skin: Negative for rash.   Neurological: Positive for syncope. Negative for weakness.   Hematological: Does not bruise/bleed easily.     Physical Exam      Initial Vitals [04/13/18 0758]   BP Pulse Resp Temp SpO2   (!) 181/104 87 18 98.5 °F (36.9 °C) 100 %      MAP       129.67          Physical Exam  Nursing Notes and Vital Signs Reviewed.  Constitutional: Patient is in no acute distress. Well-developed and well-nourished.  Head: Atraumatic. Normocephalic.  Eyes: PERRL. EOM intact. Conjunctivae are not pale. No scleral icterus.  ENT: Mucous membranes are moist. Oropharynx is clear and symmetric.    Neck: Supple. Full ROM. No lymphadenopathy.  Cardiovascular: Regular rate. Regular rhythm. No murmurs, rubs, or gallops. Distal pulses are 2+ and symmetric.  Pulmonary/Chest: No respiratory distress. Clear to auscultation bilaterally. No wheezing or rales.  Abdominal: Soft and non-distended.  There is no tenderness.  No rebound, guarding, or rigidity. Good bowel sounds.  Genitourinary: No CVA tenderness  Rectal:  No tenderness.  No masses.  No hemorrhoids.  Normal sphincter tone.  Stool color is normal.  Musculoskeletal: Moves all extremities. No obvious deformities. No edema. No calf tenderness.  Skin: Warm and dry.  Neurological:  Alert, awake, and appropriate.  Normal speech.  No acute focal neurological deficits are appreciated.  Psychiatric: Normal affect. Good eye contact. Appropriate in content.    ED Course    Procedures  ED Vital Signs:  Vitals:    04/13/18 0758   BP: (!) 181/104   Pulse: 87   Resp: 18   Temp: 98.5 °F (36.9  "°C)   TempSrc: Oral   SpO2: 100%   Weight: 74.1 kg (163 lb 5.8 oz)   Height: 5' 1" (1.549 m)       Abnormal Lab Results:  Labs Reviewed   CBC W/ AUTO DIFFERENTIAL - Abnormal; Notable for the following:        Result Value    MCH 31.3 (*)     Gran # (ANC) 8.5 (*)     Lymph # 0.7 (*)     Mono # 0.1 (*)     Gran% 90.7 (*)     Lymph% 7.8 (*)     Mono% 1.3 (*)     All other components within normal limits   COMPREHENSIVE METABOLIC PANEL - Abnormal; Notable for the following:     Glucose 167 (*)     eGFR if non  58 (*)     All other components within normal limits   LACTIC ACID, PLASMA - Abnormal; Notable for the following:     Lactate (Lactic Acid) 2.6 (*)     All other components within normal limits   PROTIME-INR   APTT   TROPONIN I   CBC W/ AUTO DIFFERENTIAL   CBC W/ AUTO DIFFERENTIAL   LACTIC ACID, PLASMA   TYPE & SCREEN        All Lab Results:  Results for orders placed or performed during the hospital encounter of 04/13/18   CBC auto differential   Result Value Ref Range    WBC 9.39 3.90 - 12.70 K/uL    RBC 4.31 4.00 - 5.40 M/uL    Hemoglobin 13.5 12.0 - 16.0 g/dL    Hematocrit 38.1 37.0 - 48.5 %    MCV 88 82 - 98 fL    MCH 31.3 (H) 27.0 - 31.0 pg    MCHC 35.4 32.0 - 36.0 g/dL    RDW 12.2 11.5 - 14.5 %    Platelets 240 150 - 350 K/uL    MPV 10.0 9.2 - 12.9 fL    Gran # (ANC) 8.5 (H) 1.8 - 7.7 K/uL    Lymph # 0.7 (L) 1.0 - 4.8 K/uL    Mono # 0.1 (L) 0.3 - 1.0 K/uL    Eos # 0.0 0.0 - 0.5 K/uL    Baso # 0.01 0.00 - 0.20 K/uL    Gran% 90.7 (H) 38.0 - 73.0 %    Lymph% 7.8 (L) 18.0 - 48.0 %    Mono% 1.3 (L) 4.0 - 15.0 %    Eosinophil% 0.1 0.0 - 8.0 %    Basophil% 0.1 0.0 - 1.9 %    Differential Method Automated    Comprehensive metabolic panel   Result Value Ref Range    Sodium 136 136 - 145 mmol/L    Potassium 3.8 3.5 - 5.1 mmol/L    Chloride 98 95 - 110 mmol/L    CO2 24 23 - 29 mmol/L    Glucose 167 (H) 70 - 110 mg/dL    BUN, Bld 15 6 - 20 mg/dL    Creatinine 1.1 0.5 - 1.4 mg/dL    Calcium 10.3 8.7 - " 10.5 mg/dL    Total Protein 7.5 6.0 - 8.4 g/dL    Albumin 4.4 3.5 - 5.2 g/dL    Total Bilirubin 1.0 0.1 - 1.0 mg/dL    Alkaline Phosphatase 77 55 - 135 U/L    AST 30 10 - 40 U/L    ALT 26 10 - 44 U/L    Anion Gap 14 8 - 16 mmol/L    eGFR if African American >60 >60 mL/min/1.73 m^2    eGFR if non African American 58 (A) >60 mL/min/1.73 m^2   Protime-INR   Result Value Ref Range    Prothrombin Time 10.0 9.0 - 12.5 sec    INR 1.0 0.8 - 1.2   APTT   Result Value Ref Range    aPTT 22.8 21.0 - 32.0 sec   Troponin I   Result Value Ref Range    Troponin I <0.006 0.000 - 0.026 ng/mL   Lactic acid, plasma   Result Value Ref Range    Lactate (Lactic Acid) 2.6 (H) 0.5 - 2.2 mmol/L   Type & Screen   Result Value Ref Range    Group & Rh A POS     Indirect Peter NEG          Imaging Results:  Imaging Results          US Carotid Bilateral (In process)                CT Abdomen Pelvis With Contrast (Final result)  Result time 04/13/18 10:15:08    Final result by Teja Merrill MD (04/13/18 10:15:08)                 Impression:        Thickening and pericolonic inflammatory changes are seen throughout the transverse and descending colon consistent with infectious or inflammatory colitis.     Mild mucosal hyperemia seen within fluid-filled nondistended ileal loops which can also be seen with enteritis.     All CT scans at this facility use dose modulation, iterative reconstruction and/or weight based dosing when appropriate to reduce radiation dose to as low as reasonably achievable.      Electronically signed by: TEJA MERRILL MD  Date:     04/13/18  Time:    10:15              Narrative:    Clinical data: Abdominal pain.    Axial images through the abdomen and pelvis were obtained  after administration of 75 cc of omni-350 contrast. Coronal and sagittal reformatted images were also submitted for interpretation.    Findings:    The visualized portion of the heart is normal.  No pericardial effusion. The lung bases are clear.  No  pleural effusion.    The liver, gallbladder, biliary system, pancreas, stomach, spleen, adrenal glands are normal.The aorta demonstrates no significant atherosclerotic plaque.No lymphadenopathy.    The kidneys demonstrate normal size, position, contrast excretion with no focal abnormalities or hydronephrosis.The bladder is unremarkable. .    The small bowel demonstrates no evidence of obstruction or ileus. Mild mucosal hyperemia seen within fluid-filled nondistended ileal loops which can also be seen with enteritis. Thickening and pericolonic inflammatory changes are seen throughout the transverse and descending colon consistent with colitis. No evidence of pneumatosis or free air.. The appendix is normal. Changes from ventral hernia repair are seen within the anterior abdominal wall.    Bones appear intact .                             X-Ray Chest AP Portable (Final result)  Result time 04/13/18 08:59:30    Final result by Teja Merrill MD (04/13/18 08:59:30)                 Impression:       No acute process seen.      Electronically signed by: TEJA MERRILL MD  Date:     04/13/18  Time:    08:59              Narrative:    Clinical Data:Syncope    Comparison:  none    Findings:  Single view of the chest.      Cardiac silhouette is normal.  The lungs demonstrate no evidence of active disease.  No evidence of pleural effusion or pneumothorax.  Bones appear intact.                                      The EKG was ordered, reviewed, and independently interpreted by the ED provider.  Interpretation time: 8:23  Rate: 89 BPM  Rhythm: sinus with short DC  Interpretation: Prolonged QT. No STEMI.      The Emergency Provider reviewed the vital signs and test results, which are outlined above.    ED Discussion     10:31 AM: Discussed case with MILO Santana ([facility]). Camryn agrees with current care and management of pt and accepts admission.   Admitting Service: Hospital medicine   Admitting Physician:   Michell   Admit to: telemetry    10:32 AM: Re-evaluated pt. I have discussed test results, shared treatment plan, and the need for admission with patient and family at bedside. Pt and family express understanding at this time and agree with all information. All questions answered. Pt and family have no further questions or concerns at this time. Pt is ready for admit.    ED Medication(s):  Medications   ciprofloxacin (CIPRO)400mg/200ml D5W IVPB 400 mg (not administered)   dextrose 5 % and 0.9 % NaCl infusion (not administered)   morphine injection 2 mg (not administered)   morphine injection 4 mg (not administered)   ondansetron injection 4 mg (not administered)   pantoprazole 40 mg in dextrose 5 % 100 mL infusion (ready to mix system) (not administered)   metronidazole IVPB 500 mg (not administered)   pantoprazole 40 mg in dextrose 5 % 100 mL infusion (ready to mix system) (0 mg Intravenous Stopped 4/13/18 1004)   morphine injection 4 mg (4 mg Intravenous Given 4/13/18 0909)   ondansetron injection 4 mg (4 mg Intravenous Given 4/13/18 0909)   omnipaque 350 iohexol 75 mL (75 mLs Intravenous Given 4/13/18 0953)   metronidazole IVPB 500 mg (500 mg Intravenous New Bag 4/13/18 1051)       New Prescriptions    No medications on file             Medical Decision Making    Medical Decision Making:   Clinical Tests:   Lab Tests: Reviewed and Ordered  Radiological Study: Reviewed and Ordered  Medical Tests: Ordered and Reviewed           Scribe Attestation:   Scribe #1: I performed the above scribed service and the documentation accurately describes the services I performed. I attest to the accuracy of the note.    Attending:   Physician Attestation Statement for Scribe #1: I, Jf Baeza Jr., MD, personally performed the services described in this documentation, as scribed by Sivakumar Harvey, in my presence, and it is both accurate and complete.          Clinical Impression       ICD-10-CM ICD-9-CM   1. Colitis K52.9  558.9   2. Fainting episodes R55 780.2   3. Fainting spell R55 780.2   4. Syncope, unspecified syncope type R55 780.2   5. Bloody stools K92.1 578.1   6. Syncope R55 780.2       Disposition:   Disposition: Admitted  Condition: Cassandra Baeza Jr., MD  04/13/18 6673

## 2018-04-14 VITALS
RESPIRATION RATE: 17 BRPM | TEMPERATURE: 99 F | DIASTOLIC BLOOD PRESSURE: 71 MMHG | WEIGHT: 163.38 LBS | SYSTOLIC BLOOD PRESSURE: 140 MMHG | HEART RATE: 98 BPM | OXYGEN SATURATION: 98 % | HEIGHT: 61 IN | BODY MASS INDEX: 30.85 KG/M2

## 2018-04-14 PROBLEM — I65.29 CAROTID STENOSIS: Status: ACTIVE | Noted: 2018-04-14

## 2018-04-14 PROBLEM — E87.6 HYPOKALEMIA: Status: ACTIVE | Noted: 2018-04-14

## 2018-04-14 LAB
ALBUMIN SERPL BCP-MCNC: 2.8 G/DL
ALP SERPL-CCNC: 57 U/L
ALT SERPL W/O P-5'-P-CCNC: 14 U/L
ANION GAP SERPL CALC-SCNC: 10 MMOL/L
ANION GAP SERPL CALC-SCNC: 10 MMOL/L
AST SERPL-CCNC: 13 U/L
BACTERIA #/AREA URNS HPF: NORMAL /HPF
BASOPHILS # BLD AUTO: 0.01 K/UL
BASOPHILS NFR BLD: 0.2 %
BILIRUB SERPL-MCNC: 0.7 MG/DL
BILIRUB UR QL STRIP: NEGATIVE
BUN SERPL-MCNC: 5 MG/DL
BUN SERPL-MCNC: 5 MG/DL
CALCIUM SERPL-MCNC: 7.1 MG/DL
CALCIUM SERPL-MCNC: 7.1 MG/DL
CHLORIDE SERPL-SCNC: 107 MMOL/L
CHLORIDE SERPL-SCNC: 107 MMOL/L
CLARITY UR: CLEAR
CO2 SERPL-SCNC: 24 MMOL/L
CO2 SERPL-SCNC: 24 MMOL/L
COLOR UR: YELLOW
CREAT SERPL-MCNC: 0.8 MG/DL
CREAT SERPL-MCNC: 0.8 MG/DL
DIFFERENTIAL METHOD: ABNORMAL
EOSINOPHIL # BLD AUTO: 0.1 K/UL
EOSINOPHIL NFR BLD: 1.1 %
ERYTHROCYTE [DISTWIDTH] IN BLOOD BY AUTOMATED COUNT: 12.7 %
EST. GFR  (AFRICAN AMERICAN): >60 ML/MIN/1.73 M^2
EST. GFR  (AFRICAN AMERICAN): >60 ML/MIN/1.73 M^2
EST. GFR  (NON AFRICAN AMERICAN): >60 ML/MIN/1.73 M^2
EST. GFR  (NON AFRICAN AMERICAN): >60 ML/MIN/1.73 M^2
GLUCOSE SERPL-MCNC: 102 MG/DL
GLUCOSE SERPL-MCNC: 102 MG/DL
GLUCOSE UR QL STRIP: NEGATIVE
HCT VFR BLD AUTO: 29.5 %
HCT VFR BLD AUTO: 31.2 %
HCT VFR BLD AUTO: 35.8 %
HCT VFR BLD AUTO: 35.9 %
HGB BLD-MCNC: 10.5 G/DL
HGB BLD-MCNC: 12 G/DL
HGB BLD-MCNC: 12.1 G/DL
HGB BLD-MCNC: 12.1 G/DL
HGB BLD-MCNC: 8.6 G/DL
HGB UR QL STRIP: NEGATIVE
KETONES UR QL STRIP: NEGATIVE
LEUKOCYTE ESTERASE UR QL STRIP: ABNORMAL
LYMPHOCYTES # BLD AUTO: 1.8 K/UL
LYMPHOCYTES NFR BLD: 28.3 %
MAGNESIUM SERPL-MCNC: 1.4 MG/DL
MCH RBC QN AUTO: 31 PG
MCHC RBC AUTO-ENTMCNC: 33.7 G/DL
MCV RBC AUTO: 92 FL
MICROSCOPIC COMMENT: NORMAL
MONOCYTES # BLD AUTO: 0.5 K/UL
MONOCYTES NFR BLD: 8.4 %
NEUTROPHILS # BLD AUTO: 3.9 K/UL
NEUTROPHILS NFR BLD: 62 %
NITRITE UR QL STRIP: NEGATIVE
PH UR STRIP: 6 [PH] (ref 5–8)
PHOSPHATE SERPL-MCNC: 4.4 MG/DL
PLATELET # BLD AUTO: 162 K/UL
PMV BLD AUTO: 9.7 FL
POTASSIUM SERPL-SCNC: 2.7 MMOL/L
POTASSIUM SERPL-SCNC: 5.1 MMOL/L
PROT SERPL-MCNC: 4.9 G/DL
PROT UR QL STRIP: NEGATIVE
RBC # BLD AUTO: 3.39 M/UL
SODIUM SERPL-SCNC: 141 MMOL/L
SODIUM SERPL-SCNC: 141 MMOL/L
SP GR UR STRIP: 1.02 (ref 1–1.03)
SQUAMOUS #/AREA URNS HPF: 1 /HPF
URN SPEC COLLECT METH UR: ABNORMAL
UROBILINOGEN UR STRIP-ACNC: NEGATIVE EU/DL
WBC # BLD AUTO: 6.3 K/UL
WBC #/AREA URNS HPF: 2 /HPF (ref 0–5)

## 2018-04-14 PROCEDURE — G0378 HOSPITAL OBSERVATION PER HR: HCPCS

## 2018-04-14 PROCEDURE — 84100 ASSAY OF PHOSPHORUS: CPT

## 2018-04-14 PROCEDURE — 96375 TX/PRO/DX INJ NEW DRUG ADDON: CPT

## 2018-04-14 PROCEDURE — 80053 COMPREHEN METABOLIC PANEL: CPT

## 2018-04-14 PROCEDURE — 96366 THER/PROPH/DIAG IV INF ADDON: CPT

## 2018-04-14 PROCEDURE — 85018 HEMOGLOBIN: CPT | Mod: 91

## 2018-04-14 PROCEDURE — 83735 ASSAY OF MAGNESIUM: CPT

## 2018-04-14 PROCEDURE — 85014 HEMATOCRIT: CPT

## 2018-04-14 PROCEDURE — 85018 HEMOGLOBIN: CPT

## 2018-04-14 PROCEDURE — 25000003 PHARM REV CODE 250: Performed by: PHYSICIAN ASSISTANT

## 2018-04-14 PROCEDURE — 84132 ASSAY OF SERUM POTASSIUM: CPT

## 2018-04-14 PROCEDURE — S0030 INJECTION, METRONIDAZOLE: HCPCS | Performed by: EMERGENCY MEDICINE

## 2018-04-14 PROCEDURE — 25000003 PHARM REV CODE 250: Performed by: EMERGENCY MEDICINE

## 2018-04-14 PROCEDURE — 81000 URINALYSIS NONAUTO W/SCOPE: CPT

## 2018-04-14 PROCEDURE — 25000003 PHARM REV CODE 250: Performed by: NURSE PRACTITIONER

## 2018-04-14 PROCEDURE — 96367 TX/PROPH/DG ADDL SEQ IV INF: CPT

## 2018-04-14 PROCEDURE — 63600175 PHARM REV CODE 636 W HCPCS: Performed by: EMERGENCY MEDICINE

## 2018-04-14 PROCEDURE — 63600175 PHARM REV CODE 636 W HCPCS: Performed by: PHYSICIAN ASSISTANT

## 2018-04-14 PROCEDURE — 36415 COLL VENOUS BLD VENIPUNCTURE: CPT

## 2018-04-14 PROCEDURE — 96376 TX/PRO/DX INJ SAME DRUG ADON: CPT

## 2018-04-14 PROCEDURE — 85025 COMPLETE CBC W/AUTO DIFF WBC: CPT

## 2018-04-14 PROCEDURE — 96365 THER/PROPH/DIAG IV INF INIT: CPT

## 2018-04-14 PROCEDURE — 63600175 PHARM REV CODE 636 W HCPCS: Performed by: NURSE PRACTITIONER

## 2018-04-14 RX ORDER — POTASSIUM CHLORIDE 20 MEQ/15ML
40 SOLUTION ORAL ONCE
Status: COMPLETED | OUTPATIENT
Start: 2018-04-14 | End: 2018-04-14

## 2018-04-14 RX ORDER — METRONIDAZOLE 500 MG/1
500 TABLET ORAL EVERY 12 HOURS
Qty: 14 TABLET | Refills: 0 | Status: SHIPPED | OUTPATIENT
Start: 2018-04-14 | End: 2018-04-21

## 2018-04-14 RX ORDER — POTASSIUM CHLORIDE 20 MEQ/1
60 TABLET, EXTENDED RELEASE ORAL ONCE
Status: COMPLETED | OUTPATIENT
Start: 2018-04-14 | End: 2018-04-14

## 2018-04-14 RX ORDER — METRONIDAZOLE 500 MG/1
500 TABLET ORAL EVERY 12 HOURS
Qty: 14 TABLET | Refills: 0 | Status: SHIPPED | OUTPATIENT
Start: 2018-04-14 | End: 2018-04-14

## 2018-04-14 RX ORDER — POTASSIUM CHLORIDE 20 MEQ/1
40 TABLET, EXTENDED RELEASE ORAL ONCE
Status: COMPLETED | OUTPATIENT
Start: 2018-04-14 | End: 2018-04-14

## 2018-04-14 RX ORDER — TRAMADOL HYDROCHLORIDE 50 MG/1
50 TABLET ORAL EVERY 6 HOURS PRN
Qty: 15 TABLET | Refills: 0 | Status: SHIPPED | OUTPATIENT
Start: 2018-04-14

## 2018-04-14 RX ORDER — POTASSIUM CHLORIDE 20 MEQ/1
40 TABLET, EXTENDED RELEASE ORAL ONCE
Status: DISCONTINUED | OUTPATIENT
Start: 2018-04-14 | End: 2018-04-14

## 2018-04-14 RX ORDER — CIPROFLOXACIN 500 MG/1
500 TABLET ORAL EVERY 12 HOURS
Qty: 14 TABLET | Refills: 0 | Status: SHIPPED | OUTPATIENT
Start: 2018-04-14 | End: 2018-04-14

## 2018-04-14 RX ORDER — CIPROFLOXACIN 500 MG/1
500 TABLET ORAL EVERY 12 HOURS
Qty: 14 TABLET | Refills: 0 | Status: SHIPPED | OUTPATIENT
Start: 2018-04-14 | End: 2018-04-21

## 2018-04-14 RX ADMIN — POTASSIUM CHLORIDE 60 MEQ: 1500 TABLET, EXTENDED RELEASE ORAL at 08:04

## 2018-04-14 RX ADMIN — METRONIDAZOLE 500 MG: 500 INJECTION, SOLUTION INTRAVENOUS at 04:04

## 2018-04-14 RX ADMIN — CIPROFLOXACIN 400 MG: 2 INJECTION, SOLUTION INTRAVENOUS at 11:04

## 2018-04-14 RX ADMIN — LEVOTHYROXINE SODIUM 100 MCG: 100 TABLET ORAL at 05:04

## 2018-04-14 RX ADMIN — POTASSIUM CHLORIDE 40 MEQ: 1500 TABLET, EXTENDED RELEASE ORAL at 11:04

## 2018-04-14 RX ADMIN — POTASSIUM CHLORIDE 40 MEQ: 20 SOLUTION ORAL at 02:04

## 2018-04-14 RX ADMIN — PANTOPRAZOLE SODIUM 40 MG: 40 TABLET, DELAYED RELEASE ORAL at 08:04

## 2018-04-14 RX ADMIN — METRONIDAZOLE 500 MG: 500 INJECTION, SOLUTION INTRAVENOUS at 12:04

## 2018-04-14 RX ADMIN — OXYCODONE HYDROCHLORIDE AND ACETAMINOPHEN 1 TABLET: 5; 325 TABLET ORAL at 01:04

## 2018-04-14 RX ADMIN — OXYCODONE HYDROCHLORIDE AND ACETAMINOPHEN 1 TABLET: 5; 325 TABLET ORAL at 04:04

## 2018-04-14 RX ADMIN — OXYCODONE HYDROCHLORIDE AND ACETAMINOPHEN 1 TABLET: 5; 325 TABLET ORAL at 08:04

## 2018-04-14 RX ADMIN — MAGNESIUM SULFATE HEPTAHYDRATE 3 G: 500 INJECTION, SOLUTION INTRAMUSCULAR; INTRAVENOUS at 10:04

## 2018-04-14 RX ADMIN — MORPHINE SULFATE 2 MG: 4 INJECTION INTRAVENOUS at 02:04

## 2018-04-14 RX ADMIN — ESCITALOPRAM OXALATE 10 MG: 10 TABLET, FILM COATED ORAL at 08:04

## 2018-04-14 RX ADMIN — DEXTROSE AND SODIUM CHLORIDE: 5; .9 INJECTION, SOLUTION INTRAVENOUS at 04:04

## 2018-04-14 RX ADMIN — OXYCODONE HYDROCHLORIDE AND ACETAMINOPHEN 1 TABLET: 5; 325 TABLET ORAL at 12:04

## 2018-04-14 NOTE — ASSESSMENT & PLAN NOTE
Secondary to  to a vasovagal episode.   Pt does have carotid stenosis which will need to be followed by Cardiology

## 2018-04-14 NOTE — PLAN OF CARE
Problem: Patient Care Overview  Goal: Plan of Care Review  Outcome: Ongoing (interventions implemented as appropriate)  POC discussed w/patient, verbalized understanding. NSR/ST on monitor. VSS. Voids per BRP. PT c/o pain relieved with morphine and percocet. Pt has two bloody stool NP made aware. IV atx and electrolyte replacement administered. Frequent weight change encouraged. Patient turns independently in bed. Fall precautions in place, bed alarm on. Patient denies needs at this time.

## 2018-04-14 NOTE — SUBJECTIVE & OBJECTIVE
Review of Systems   Constitutional: Negative for appetite change, chills, diaphoresis, fatigue, fever and unexpected weight change.   HENT: Negative for congestion, nosebleeds, sinus pressure and sore throat.    Eyes: Negative for pain, discharge and visual disturbance.   Respiratory: Negative for cough, chest tightness, shortness of breath, wheezing and stridor.    Cardiovascular: Negative for chest pain, palpitations and leg swelling.   Gastrointestinal: Negative for abdominal distention, abdominal pain, blood in stool, constipation, diarrhea, nausea and vomiting.   Endocrine: Negative for cold intolerance and heat intolerance.   Genitourinary: Negative for difficulty urinating, dysuria, flank pain, frequency and urgency.   Musculoskeletal: Negative for arthralgias, back pain, joint swelling, myalgias, neck pain and neck stiffness.   Skin: Negative for rash and wound.   Allergic/Immunologic: Negative for food allergies and immunocompromised state.   Neurological: Negative for dizziness, seizures, syncope, facial asymmetry, speech difficulty, weakness, light-headedness, numbness and headaches.   Hematological: Negative for adenopathy.   Psychiatric/Behavioral: Negative for agitation, confusion and hallucinations.     Objective:     Vital Signs (Most Recent):  Temp: 98.2 °F (36.8 °C) (04/14/18 1144)  Pulse: 81 (04/14/18 1144)  Resp: 17 (04/14/18 1144)  BP: (!) 155/88 (04/14/18 1144)  SpO2: 96 % (04/14/18 1144) Vital Signs (24h Range):  Temp:  [97.8 °F (36.6 °C)-99.3 °F (37.4 °C)] 98.2 °F (36.8 °C)  Pulse:  [] 81  Resp:  [17-18] 17  SpO2:  [96 %-100 %] 96 %  BP: ()/(59-88) 155/88     Weight: 74.1 kg (163 lb 5.8 oz)  Body mass index is 30.87 kg/m².  No intake or output data in the 24 hours ending 04/14/18 1450   Physical Exam   Constitutional: She is oriented to person, place, and time. She appears well-developed and well-nourished. No distress.   HENT:   Head: Normocephalic and atraumatic.   Nose:  Nose normal.   Mouth/Throat: Oropharynx is clear and moist.   Eyes: Conjunctivae and EOM are normal. No scleral icterus.   Neck: Normal range of motion. Neck supple. No tracheal deviation present.   Cardiovascular: Normal rate, regular rhythm, normal heart sounds and intact distal pulses.  Exam reveals no gallop and no friction rub.    No murmur heard.  Pulmonary/Chest: Effort normal and breath sounds normal. No stridor. No respiratory distress. She has no wheezes. She has no rales. She exhibits no tenderness.   Abdominal: Soft. Bowel sounds are normal. She exhibits no distension and no mass. There is no tenderness. There is no rebound and no guarding.   Musculoskeletal: Normal range of motion. She exhibits no edema, tenderness or deformity.   Neurological: She is alert and oriented to person, place, and time. No cranial nerve deficit. She exhibits normal muscle tone. Coordination normal.   Skin: Skin is warm and dry. No rash noted. She is not diaphoretic. No erythema. No pallor.   Psychiatric: She has a normal mood and affect. Her behavior is normal. Thought content normal.       Significant Labs:   BMP:   Recent Labs  Lab 04/14/18  0441 04/14/18  1244     102  --      141  --    K 2.7*  2.7* 2.7*     107  --    CO2 24  24  --    BUN 5*  5*  --    CREATININE 0.8  0.8  --    CALCIUM 7.1*  7.1*  --    MG 1.4*  --      CBC:   Recent Labs  Lab 04/13/18  1232 04/13/18  1525  04/13/18  2350 04/14/18 0441 04/14/18  0903   WBC 12.75* 8.42  --   --  6.30  --    HGB 13.6 10.1*  < > 12.1 10.5* 12.1   HCT 38.3 32.3*  < > 35.9* 31.2* 35.8*    180  --   --  162  --    < > = values in this interval not displayed.  CMP:   Recent Labs  Lab 04/13/18  0832 04/14/18 0441 04/14/18  1244    141  141  --    K 3.8 2.7*  2.7* 2.7*   CL 98 107  107  --    CO2 24 24  24  --    * 102  102  --    BUN 15 5*  5*  --    CREATININE 1.1 0.8  0.8  --    CALCIUM 10.3 7.1*  7.1*  --    PROT 7.5  4.9*  --    ALBUMIN 4.4 2.8*  --    BILITOT 1.0 0.7  --    ALKPHOS 77 57  --    AST 30 13  --    ALT 26 14  --    ANIONGAP 14 10  10  --    EGFRNONAA 58* >60  >60  --      All pertinent labs within the past 24 hours have been reviewed.    Significant Imaging:   Imaging Results          MRI Brain Without Contrast (Final result)  Result time 04/13/18 13:47:41    Final result by Paresh Rosales MD (04/13/18 13:47:41)                 Impression:      1.  No evidence for acute CVA.  2.  No acute intracranial process.  3.  Mild chronic small vessel ischemic change of the white matter.      Electronically signed by: PARESH ROSALES MD  Date:     04/13/18  Time:    13:47              Narrative:    Head MRI without intravenous contrast    Indication: Syncope.    Technique: Multiplanar multisequence imaging of the head is performed without intravenous contrast.    Findings: The diffusion scan shows no restricted diffusion.  Sagittal sequence shows normal midline structures.  No mass lesion or extra-axial fluid collection or cerebral edema.  There is mild chronic small vessel ischemic change the white matter.  No midline shift or hydrocephalus.  Normal arterial flow voids are seen.                             US Carotid Bilateral (Final result)  Result time 04/13/18 12:21:09    Final result by Rocael Malik MD (04/13/18 12:21:09)                 Impression:      Elevated velocity within the left proximal common carotid artery.  percent stenosis in this region 50-69%..    Minimal plaque demonstrated within bilateral proximal internal carotid arteries and the left common carotid artery.  Percent stenosis within these regions less than 50%.    Stenosis of 50-69% - validated velocity measurements with angiographic measurements, velocity criteria are extrapolated from diameter data as defined by the Society of Radiologists in Ultrasound Consensus Conference Radiology 2003; 229;340-346.      Electronically signed by: ROCAEL  CHESTER REDDY  Date:     04/13/18  Time:    12:21              Narrative:    EXAM: Carotid arterial ultrasound    CLINICAL HISTORY: Syncope.  Unspecified syncope type.    Comparison: None.    FINDINGS:  Grey scale sonography and color doppler and spectral imaging performed of the cervical portions of the carotid arteries.    Right Side:      Velocities: Non-elevated velocities..  ICA/CCA Ratios: systolic 0.6. diastolic 0.9.  Plaque: Minimal plaque right proximal internal carotid artery..  Flow: Antegrade.    Right Vertebral artery: antegrade flow.    Left Side:    Velocities: Elevated velocity proximal common carotid artery 156/12 cm/s.  Otherwise non-elevated velocities..  ICA/CCA Ratios: systolic 0.3. diastolic 1.4.  Plaque: Minimal plaque common carotid artery and proximal ICA..  Flow: antegrade.    Left Vertebral Artery: antegrade flow.                             CT Abdomen Pelvis With Contrast (Final result)  Result time 04/13/18 10:15:08    Final result by Teja Merrill MD (04/13/18 10:15:08)                 Impression:        Thickening and pericolonic inflammatory changes are seen throughout the transverse and descending colon consistent with infectious or inflammatory colitis.     Mild mucosal hyperemia seen within fluid-filled nondistended ileal loops which can also be seen with enteritis.     All CT scans at this facility use dose modulation, iterative reconstruction and/or weight based dosing when appropriate to reduce radiation dose to as low as reasonably achievable.      Electronically signed by: TEJA MERRILL MD  Date:     04/13/18  Time:    10:15              Narrative:    Clinical data: Abdominal pain.    Axial images through the abdomen and pelvis were obtained  after administration of 75 cc of omni-350 contrast. Coronal and sagittal reformatted images were also submitted for interpretation.    Findings:    The visualized portion of the heart is normal.  No pericardial effusion. The lung bases  are clear.  No pleural effusion.    The liver, gallbladder, biliary system, pancreas, stomach, spleen, adrenal glands are normal.The aorta demonstrates no significant atherosclerotic plaque.No lymphadenopathy.    The kidneys demonstrate normal size, position, contrast excretion with no focal abnormalities or hydronephrosis.The bladder is unremarkable. .    The small bowel demonstrates no evidence of obstruction or ileus. Mild mucosal hyperemia seen within fluid-filled nondistended ileal loops which can also be seen with enteritis. Thickening and pericolonic inflammatory changes are seen throughout the transverse and descending colon consistent with colitis. No evidence of pneumatosis or free air.. The appendix is normal. Changes from ventral hernia repair are seen within the anterior abdominal wall.    Bones appear intact .                             X-Ray Chest AP Portable (Final result)  Result time 04/13/18 08:59:30    Final result by Teja Merrill MD (04/13/18 08:59:30)                 Impression:       No acute process seen.      Electronically signed by: TEJA MERRILL MD  Date:     04/13/18  Time:    08:59              Narrative:    Clinical Data:Syncope    Comparison:  none    Findings:  Single view of the chest.      Cardiac silhouette is normal.  The lungs demonstrate no evidence of active disease.  No evidence of pleural effusion or pneumothorax.  Bones appear intact.

## 2018-04-14 NOTE — PROGRESS NOTES
[facility] Hospital Medicine  Progress Note    Patient Name: Diane Laughlin  MRN: [id number]  Patient Class: OP- Observation   Admission Date: 4/13/2018  Length of Stay: 0 days  Attending Physician: Negrita Galarza MD  Primary Care Provider: Cesar Mccurdy MD        Subjective:     Principal Problem:Syncope    HPI:  Diane Laughlin is a 52 year old female with a history of breast cancer and hypertension who presented to the ED with complaints right sided abdominal pain, diarrhea and bright red blood per rectum that began last night. Additionally, the patient reports having several near syncopal episodes and one episode of syncope while having a bowel movement last night. She states that her right sided abdominal pain is sharp, located in her right lower quadrant and currently rates a 5/10 on a pain scale. She describes  Her diarrhea as initially watery with bright red blood streaks, but later completely red. She has not had a bowel movement since being in the ED. The patient reports that for many months, she has noted occasional bright red blood with bowel movements, but this is different. Her episode of syncope occurred last night while she was having a bowel movement. She reports straining, then being awakened by her dog barking and her daughter coming in the bathroom. She denies prior syncope. She further denies fever, chills, nausea, vomiting and chest pain. The patient reports that her full family history is unknown, but to her knowledge, she does not have a family history of inflammatory bowel disease. In the ED, the patient's CT scan of the abdomen was significant for thickening and pericolonic inflammatory changes are seen throughout the transverse and descending colon. Her WBC count was 9,370. Her hemoglobin and hematocrit were 13.5 and 38.1, respectively. The patient's metabolic panel was unremarkable.     Hospital Course:  The pt was placed in observation for syncope, rectal bleeding, lactic  acidosis and colitis. Troponin normal. Carotid U/S showed 50-69% left ICA stenosis and less than 50% right ICA stenosis. Cardiac echo was normal. No orthostasis. MRI brain was normal -MRI done since CT head could not be done due to recent IV contrast. Syncope occurred while pt was on the toilet bearing down and having abdominal cramping- Likely vaso-vagal episode.   The pt's lactic acidosis resolved. Abdominal pain, diarrhea, and rectal bleeding resolved. H/H remained stable. Pt reports abdominal pain and diarrhea on/off for several years. Instructed pt to follow up closely with GI.   Pt noted to have hypokalemia. Repeat potassium was still low at 2.7. Will replete and recheck.         Review of Systems   Constitutional: Negative for appetite change, chills, diaphoresis, fatigue, fever and unexpected weight change.   HENT: Negative for congestion, nosebleeds, sinus pressure and sore throat.    Eyes: Negative for pain, discharge and visual disturbance.   Respiratory: Negative for cough, chest tightness, shortness of breath, wheezing and stridor.    Cardiovascular: Negative for chest pain, palpitations and leg swelling.   Gastrointestinal: Negative for abdominal distention, abdominal pain, blood in stool, constipation, diarrhea, nausea and vomiting.   Endocrine: Negative for cold intolerance and heat intolerance.   Genitourinary: Negative for difficulty urinating, dysuria, flank pain, frequency and urgency.   Musculoskeletal: Negative for arthralgias, back pain, joint swelling, myalgias, neck pain and neck stiffness.   Skin: Negative for rash and wound.   Allergic/Immunologic: Negative for food allergies and immunocompromised state.   Neurological: Negative for dizziness, seizures, syncope, facial asymmetry, speech difficulty, weakness, light-headedness, numbness and headaches.   Hematological: Negative for adenopathy.   Psychiatric/Behavioral: Negative for agitation, confusion and hallucinations.     Objective:      Vital Signs (Most Recent):  Temp: 98.2 °F (36.8 °C) (04/14/18 1144)  Pulse: 81 (04/14/18 1144)  Resp: 17 (04/14/18 1144)  BP: (!) 155/88 (04/14/18 1144)  SpO2: 96 % (04/14/18 1144) Vital Signs (24h Range):  Temp:  [97.8 °F (36.6 °C)-99.3 °F (37.4 °C)] 98.2 °F (36.8 °C)  Pulse:  [] 81  Resp:  [17-18] 17  SpO2:  [96 %-100 %] 96 %  BP: ()/(59-88) 155/88     Weight: 74.1 kg (163 lb 5.8 oz)  Body mass index is 30.87 kg/m².  No intake or output data in the 24 hours ending 04/14/18 1450   Physical Exam   Constitutional: She is oriented to person, place, and time. She appears well-developed and well-nourished. No distress.   HENT:   Head: Normocephalic and atraumatic.   Nose: Nose normal.   Mouth/Throat: Oropharynx is clear and moist.   Eyes: Conjunctivae and EOM are normal. No scleral icterus.   Neck: Normal range of motion. Neck supple. No tracheal deviation present.   Cardiovascular: Normal rate, regular rhythm, normal heart sounds and intact distal pulses.  Exam reveals no gallop and no friction rub.    No murmur heard.  Pulmonary/Chest: Effort normal and breath sounds normal. No stridor. No respiratory distress. She has no wheezes. She has no rales. She exhibits no tenderness.   Abdominal: Soft. Bowel sounds are normal. She exhibits no distension and no mass. There is no tenderness. There is no rebound and no guarding.   Musculoskeletal: Normal range of motion. She exhibits no edema, tenderness or deformity.   Neurological: She is alert and oriented to person, place, and time. No cranial nerve deficit. She exhibits normal muscle tone. Coordination normal.   Skin: Skin is warm and dry. No rash noted. She is not diaphoretic. No erythema. No pallor.   Psychiatric: She has a normal mood and affect. Her behavior is normal. Thought content normal.       Significant Labs:   BMP:   Recent Labs  Lab 04/14/18  0441 04/14/18  1244     102  --      141  --    K 2.7*  2.7* 2.7*     107  --     CO2 24  24  --    BUN 5*  5*  --    CREATININE 0.8  0.8  --    CALCIUM 7.1*  7.1*  --    MG 1.4*  --      CBC:   Recent Labs  Lab 04/13/18  1232 04/13/18  1525  04/13/18  2350 04/14/18  0441 04/14/18  0903   WBC 12.75* 8.42  --   --  6.30  --    HGB 13.6 10.1*  < > 12.1 10.5* 12.1   HCT 38.3 32.3*  < > 35.9* 31.2* 35.8*    180  --   --  162  --    < > = values in this interval not displayed.  CMP:   Recent Labs  Lab 04/13/18  0832 04/14/18  0441 04/14/18  1244    141  141  --    K 3.8 2.7*  2.7* 2.7*   CL 98 107  107  --    CO2 24 24  24  --    * 102  102  --    BUN 15 5*  5*  --    CREATININE 1.1 0.8  0.8  --    CALCIUM 10.3 7.1*  7.1*  --    PROT 7.5 4.9*  --    ALBUMIN 4.4 2.8*  --    BILITOT 1.0 0.7  --    ALKPHOS 77 57  --    AST 30 13  --    ALT 26 14  --    ANIONGAP 14 10  10  --    EGFRNONAA 58* >60  >60  --      All pertinent labs within the past 24 hours have been reviewed.    Significant Imaging:   Imaging Results          MRI Brain Without Contrast (Final result)  Result time 04/13/18 13:47:41    Final result by Paresh Rosales MD (04/13/18 13:47:41)                 Impression:      1.  No evidence for acute CVA.  2.  No acute intracranial process.  3.  Mild chronic small vessel ischemic change of the white matter.      Electronically signed by: PARESH ROSALES MD  Date:     04/13/18  Time:    13:47              Narrative:    Head MRI without intravenous contrast    Indication: Syncope.    Technique: Multiplanar multisequence imaging of the head is performed without intravenous contrast.    Findings: The diffusion scan shows no restricted diffusion.  Sagittal sequence shows normal midline structures.  No mass lesion or extra-axial fluid collection or cerebral edema.  There is mild chronic small vessel ischemic change the white matter.  No midline shift or hydrocephalus.  Normal arterial flow voids are seen.                             US Carotid Bilateral (Final result)   Result time 04/13/18 12:21:09    Final result by Rocael Malik MD (04/13/18 12:21:09)                 Impression:      Elevated velocity within the left proximal common carotid artery.  percent stenosis in this region 50-69%..    Minimal plaque demonstrated within bilateral proximal internal carotid arteries and the left common carotid artery.  Percent stenosis within these regions less than 50%.    Stenosis of 50-69% - validated velocity measurements with angiographic measurements, velocity criteria are extrapolated from diameter data as defined by the Society of Radiologists in Ultrasound Consensus Conference Radiology 2003; 229;340-346.      Electronically signed by: ROCAEL MALIK MD  Date:     04/13/18  Time:    12:21              Narrative:    EXAM: Carotid arterial ultrasound    CLINICAL HISTORY: Syncope.  Unspecified syncope type.    Comparison: None.    FINDINGS:  Grey scale sonography and color doppler and spectral imaging performed of the cervical portions of the carotid arteries.    Right Side:      Velocities: Non-elevated velocities..  ICA/CCA Ratios: systolic 0.6. diastolic 0.9.  Plaque: Minimal plaque right proximal internal carotid artery..  Flow: Antegrade.    Right Vertebral artery: antegrade flow.    Left Side:    Velocities: Elevated velocity proximal common carotid artery 156/12 cm/s.  Otherwise non-elevated velocities..  ICA/CCA Ratios: systolic 0.3. diastolic 1.4.  Plaque: Minimal plaque common carotid artery and proximal ICA..  Flow: antegrade.    Left Vertebral Artery: antegrade flow.                             CT Abdomen Pelvis With Contrast (Final result)  Result time 04/13/18 10:15:08    Final result by Teja Rolon MD (04/13/18 10:15:08)                 Impression:        Thickening and pericolonic inflammatory changes are seen throughout the transverse and descending colon consistent with infectious or inflammatory colitis.     Mild mucosal hyperemia seen within fluid-filled  nondistended ileal loops which can also be seen with enteritis.     All CT scans at this facility use dose modulation, iterative reconstruction and/or weight based dosing when appropriate to reduce radiation dose to as low as reasonably achievable.      Electronically signed by: TEJA MERRILL MD  Date:     04/13/18  Time:    10:15              Narrative:    Clinical data: Abdominal pain.    Axial images through the abdomen and pelvis were obtained  after administration of 75 cc of omni-350 contrast. Coronal and sagittal reformatted images were also submitted for interpretation.    Findings:    The visualized portion of the heart is normal.  No pericardial effusion. The lung bases are clear.  No pleural effusion.    The liver, gallbladder, biliary system, pancreas, stomach, spleen, adrenal glands are normal.The aorta demonstrates no significant atherosclerotic plaque.No lymphadenopathy.    The kidneys demonstrate normal size, position, contrast excretion with no focal abnormalities or hydronephrosis.The bladder is unremarkable. .    The small bowel demonstrates no evidence of obstruction or ileus. Mild mucosal hyperemia seen within fluid-filled nondistended ileal loops which can also be seen with enteritis. Thickening and pericolonic inflammatory changes are seen throughout the transverse and descending colon consistent with colitis. No evidence of pneumatosis or free air.. The appendix is normal. Changes from ventral hernia repair are seen within the anterior abdominal wall.    Bones appear intact .                             X-Ray Chest AP Portable (Final result)  Result time 04/13/18 08:59:30    Final result by Teja Merrill MD (04/13/18 08:59:30)                 Impression:       No acute process seen.      Electronically signed by: TEJA MERRILL MD  Date:     04/13/18  Time:    08:59              Narrative:    Clinical Data:Syncope    Comparison:  none    Findings:  Single view of the chest.      Cardiac  silhouette is normal.  The lungs demonstrate no evidence of active disease.  No evidence of pleural effusion or pneumothorax.  Bones appear intact.                            Assessment/Plan:      * Syncope    Secondary to  to a vasovagal episode.   Pt does have carotid stenosis which will need to be followed by Cardiology             Hypokalemia    Replete and recheck         Carotid stenosis    OP f/u with Cardiology           Graves disease    Continue Synthroid.           Blood in stool    H/H stable   Resolved  OP f/u with GI         Hypertension    -Patient is not on home medications and reports that blood pressure elevation is exacerbated by pain.   -Hydralazine as needed.           Colitis    Cont Cipro and flagl  OP f/u with GI           VTE Risk Mitigation         Ordered     IP VTE HIGH RISK PATIENT  Once      04/13/18 1052     Place ANALI hose  Until discontinued      04/13/18 1052     Place sequential compression device  Until discontinued      04/13/18 1052     Reason for No Pharmacological VTE Prophylaxis  Once      04/13/18 1052              Francesca Sweeney NP  Department of Hospital Medicine   [facility]

## 2018-04-14 NOTE — HOSPITAL COURSE
The pt was placed in observation for syncope, rectal bleeding, lactic acidosis and colitis. Troponin normal. Carotid U/S showed 50-69% left ICA stenosis and less than 50% right ICA stenosis. Cardiac echo was normal. No orthostasis. MRI brain was normal -MRI done since CT head could not be done due to recent IV contrast. Syncope occurred while pt was on the toilet bearing down and having abdominal cramping- Likely vaso-vagal episode.   The pt's lactic acidosis resolved. Abdominal pain, diarrhea, and rectal bleeding resolved. H/H remained stable. Pt reports abdominal pain and diarrhea on/off for several years. She still had a small amount of rectal bleeding. Care discussed with GI- Instructed pt to follow up with GI Monday, April 16 at 7:45 am.   Pt noted to have hypokalemia. Repeat potassium was still low at 2.7. Potassium repleted and hypokalemia resolved.

## 2018-04-14 NOTE — PLAN OF CARE
Problem: Patient Care Overview  Goal: Plan of Care Review  Outcome: Ongoing (interventions implemented as appropriate)  POC discussed w/patient, verbalized understanding. NSR/ST on monitor. Pt c/o pain. Relieved with morphine. VSS. Voids per BRP. IV fluids infusing. IV atx per orders. Frequent weight change encouraged.  Patient turns independently in bed. Fall precautions in place, bed alarm on. Patient denies needs at this time.

## 2018-04-15 NOTE — PLAN OF CARE
04/14/18 2003   Final Note   Assessment Type Final Discharge Note   Discharge Disposition Home   Right Care Referral Info   Post Acute Recommendation No Care

## 2018-04-16 ENCOUNTER — INITIAL CONSULT (OUTPATIENT)
Dept: GASTROENTEROLOGY | Facility: CLINIC | Age: 53
End: 2018-04-16
Payer: COMMERCIAL

## 2018-04-16 VITALS
DIASTOLIC BLOOD PRESSURE: 92 MMHG | BODY MASS INDEX: 31.39 KG/M2 | HEIGHT: 61 IN | SYSTOLIC BLOOD PRESSURE: 152 MMHG | WEIGHT: 166.25 LBS | HEART RATE: 100 BPM

## 2018-04-16 DIAGNOSIS — K52.9 COLITIS: ICD-10-CM

## 2018-04-16 DIAGNOSIS — R19.7 DIARRHEA, UNSPECIFIED TYPE: Primary | ICD-10-CM

## 2018-04-16 DIAGNOSIS — R10.31 RLQ ABDOMINAL PAIN: ICD-10-CM

## 2018-04-16 PROCEDURE — 99999 PR PBB SHADOW E&M-EST. PATIENT-LVL III: CPT | Mod: PBBFAC,,, | Performed by: NURSE PRACTITIONER

## 2018-04-16 PROCEDURE — 99214 OFFICE O/P EST MOD 30 MIN: CPT | Mod: SA,S$GLB,, | Performed by: NURSE PRACTITIONER

## 2018-04-16 RX ORDER — LISINOPRIL AND HYDROCHLOROTHIAZIDE 10; 12.5 MG/1; MG/1
1 TABLET ORAL DAILY
COMMUNITY

## 2018-04-16 RX ORDER — SODIUM, POTASSIUM,MAG SULFATES 17.5-3.13G
SOLUTION, RECONSTITUTED, ORAL ORAL
Qty: 354 ML | Refills: 0 | Status: ON HOLD | OUTPATIENT
Start: 2018-04-16 | End: 2018-04-24 | Stop reason: HOSPADM

## 2018-04-16 RX ORDER — AMOXICILLIN 500 MG
CAPSULE ORAL DAILY
COMMUNITY

## 2018-04-16 NOTE — PROGRESS NOTES
Clinic Consult:  [facility] Consultation Note    Reason for Consult:  The primary encounter diagnosis was Diarrhea, unspecified type. Diagnoses of Colitis and RLQ abdominal pain were also pertinent to this visit.    PCP: Cesar Mccurdy   [address] [facility] / [place] [id number]    HPI:  This is a 52 y.o. female here for evaluation of the above. She is here for hospital follow up. She is known to the GI department form previous encounter but is new to me. She was recently hospitalized for observation over the weekend after being going to the ER post syncopal episode and rectal bleeding. She admits to having rectal bleeding with almost every bowel movement. Bleeding is dark to bright red blood. It is noticed in the toilet. Onset of this was between 9 months and 1 year ago. associated symptoms include abdominal pain and diarrhea. Diarrhea is intermittent and is alternated with constipation. Most of her problems are with constipation and only has a bowel movement every 1-2 weeks. Abdominal pain is located in the RLQ and worsens right before having a bowel movement and partially resolves after defecation. She also admits to having episodes of syncope or near-syncope. She starts with flushing and feeling hot. She will then have to have a bowel movement. Again, this is sometimes constipation or diarrhea. She has had a colonoscopy in her remote past for abdominal complaints. This was >10 years ago. She reports no polyps. She had a CT scan while hospitalized with showed some inflammation of the small bowel as well as the transverse and descending colon. She had an episode of rectal bleedintg at home since discharge. She was discharged on Cipro and Flagyl. No stool studies collected.     Review of Systems   Constitutional: Negative for fever, malaise/fatigue and weight loss.   HENT: Negative for sore throat.    Respiratory: Negative for cough and wheezing.    Cardiovascular: Negative for chest pain and  "palpitations.   Gastrointestinal: Positive for abdominal pain, blood in stool, constipation and diarrhea. Negative for heartburn, melena, nausea and vomiting.   Genitourinary: Negative for dysuria and frequency.   Musculoskeletal: Negative for back pain, joint pain, myalgias and neck pain.   Skin: Negative for itching and rash.   Neurological: Negative for dizziness, speech change, seizures, loss of consciousness and headaches.   Psychiatric/Behavioral: Negative for depression and substance abuse. The patient is not nervous/anxious.        Medical History:  has a past medical history of Breast cancer; Chronic back pain; Graves disease; and Hypertension.    Surgical History:  has a past surgical history that includes Hysterectomy; Knee surgery (Left); Foot surgery; and  section.    Family History: family history includes Heart disease in her father and mother..     Social History:  reports that she has never smoked. She has never used smokeless tobacco. She reports that she does not drink alcohol or use drugs.    Allergies: Reviewed    Home Medications:   Current Outpatient Prescriptions on File Prior to Visit   Medication Sig Dispense Refill    ciprofloxacin HCl (CIPRO) 500 MG tablet Take 1 tablet (500 mg total) by mouth every 12 (twelve) hours. 14 tablet 0    escitalopram oxalate (LEXAPRO) 10 MG tablet Take 10 mg by mouth once daily.      levothyroxine (SYNTHROID) 100 MCG tablet Take 75 mcg by mouth.       metroNIDAZOLE (FLAGYL) 500 MG tablet Take 1 tablet (500 mg total) by mouth every 12 (twelve) hours. 14 tablet 0    traMADol (ULTRAM) 50 mg tablet Take 1 tablet (50 mg total) by mouth every 6 (six) hours as needed for Pain. 15 tablet 0     No current facility-administered medications on file prior to visit.        Physical Exam:  BP (!) 152/92   Pulse 100   Ht 5' 1" (1.549 m)   Wt 75.4 kg (166 lb 3.6 oz)   BMI 31.41 kg/m²   Body mass index is 31.41 kg/m².  Physical Exam   Constitutional: She is " "oriented to person, place, and time and well-developed, well-nourished, and in no distress. No distress.   HENT:   Head: Normocephalic.   Eyes: Conjunctivae are normal. Pupils are equal, round, and reactive to light.   Cardiovascular: Normal rate, regular rhythm and normal heart sounds.    Pulmonary/Chest: Effort normal and breath sounds normal. No respiratory distress.   Abdominal: Soft. Bowel sounds are normal. She exhibits no distension. There is no tenderness.   Neurological: She is alert and oriented to person, place, and time. No cranial nerve deficit.   Skin: Skin is warm and dry. No rash noted.   Psychiatric: Mood and affect normal.       Labs: Pertinent labs reviewed.    Assessment:  1. Diarrhea, unspecified type    2. Colitis    3. RLQ abdominal pain         Recommendations:  - unclear cause of symptoms/abnormal Ct scan  - she was place on Cipro and Flagyl which should cover infectious etiology  - no plan for stool studies as she has been on antibiotics  - will need a colonoscopy for further evaluation.   - DDX include infectious colitis, IBD, colon cancer, and possible carcinoid tumor given the symptom "flushing" followed by bowel movement.  -     Case request GI: COLONOSCOPY  -     SUPREP BOWEL PREP KIT 17.5-3.13-1.6 gram SolR; Use as directed  Dispense: 354 mL; Refill: 0    Follow up to be determined after procedure.    Thank you so much for allowing me to participate in the care of KRISHNA Willis  "

## 2018-04-16 NOTE — LETTER
April 16, 2018      Cesar Mccurdy MD  [address] [facility] [id number]           [facility]  [address] [place] [id number]  Phone: [phone]  Fax: [phone]          Patient: Diane Laughlin   MR Number: [id number]   YOB: 1965   Date of Visit: 4/16/2018       Dear Dr. Cesar Mccurdy:    Thank you for referring Diane Laughlin to me for evaluation. Attached you will find relevant portions of my assessment and plan of care.    If you have questions, please do not hesitate to call me. I look forward to following Diane Laughlin along with you.    Sincerely,    Estee Abraham, NP    Enclosure  CC:  No Recipients    If you would like to receive this communication electronically, please contact [email or web address] or [phone] to request more information on [employer] access.    For providers and/or their staff who would like to refer a patient to [facility], please contact us through our one-stop-shop provider referral line, [facility], at [phone].    If you feel you have received this communication in error or would no longer like to receive these types of communications, please e-mail [email or web address]

## 2018-04-16 NOTE — DISCHARGE SUMMARY
[facility] Hospital Medicine  Discharge Summary      Patient Name: Diane Laughlin  MRN: [id number]  Admission Date: 4/13/2018  Hospital Length of Stay: 0 days  Discharge Date 4/14/18  Attending Physician: Dr. Galarza   Discharging Provider: Francesca Sweeney NP  Primary Care Provider: Cesar Mccurdy MD      HPI:   Diane Laughlin is a 52 year old female with a history of breast cancer and hypertension who presented to the ED with complaints right sided abdominal pain, diarrhea and bright red blood per rectum that began last night. Additionally, the patient reports having several near syncopal episodes and one episode of syncope while having a bowel movement last night. She states that her right sided abdominal pain is sharp, located in her right lower quadrant and currently rates a 5/10 on a pain scale. She describes  Her diarrhea as initially watery with bright red blood streaks, but later completely red. She has not had a bowel movement since being in the ED. The patient reports that for many months, she has noted occasional bright red blood with bowel movements, but this is different. Her episode of syncope occurred last night while she was having a bowel movement. She reports straining, then being awakened by her dog barking and her daughter coming in the bathroom. She denies prior syncope. She further denies fever, chills, nausea, vomiting and chest pain. The patient reports that her full family history is unknown, but to her knowledge, she does not have a family history of inflammatory bowel disease. In the ED, the patient's CT scan of the abdomen was significant for thickening and pericolonic inflammatory changes are seen throughout the transverse and descending colon. Her WBC count was 9,370. Her hemoglobin and hematocrit were 13.5 and 38.1, respectively. The patient's metabolic panel was unremarkable.     * No surgery found *      Hospital Course:   The pt was placed in observation for syncope,  rectal bleeding, lactic acidosis and colitis. Troponin normal. Carotid U/S showed 50-69% left ICA stenosis and less than 50% right ICA stenosis. Cardiac echo was normal. No orthostasis. MRI brain was normal -MRI done since CT head could not be done due to recent IV contrast. Syncope occurred while pt was on the toilet bearing down and having abdominal cramping- Likely vaso-vagal episode.   The pt's lactic acidosis resolved. Abdominal pain, diarrhea, and rectal bleeding resolved. H/H remained stable. Pt reports abdominal pain and diarrhea on/off for several years. She still had a small amount of rectal bleeding. Care discussed with GI- Instructed pt to follow up with GI Monday, April 16 at 7:45 am.   Pt noted to have hypokalemia. Repeat potassium was still low at 2.7. Potassium repleted and hypokalemia resolved.        Final Active Diagnoses:    Diagnosis Date Noted POA    PRINCIPAL PROBLEM:  Syncope [R55] 04/13/2018 Yes    Blood in stool [K92.1] 04/13/2018 Yes    Carotid stenosis [I65.29] 04/14/2018 Yes    Hypokalemia [E87.6] 04/14/2018 Yes    Colitis [K52.9] 04/13/2018 Yes    Hypertension [I10] 04/13/2018 Yes    Graves disease [E05.00]  Yes      Problems Resolved During this Admission:    Diagnosis Date Noted Date Resolved POA       Discharged Condition: stable    Disposition: Home or Self Care    Follow Up:  Follow-up Information     Cesar Mccurdy MD In 3 days.    Specialty:  General Practice  Contact information:  [address] [facility] [address]  [phone]             Estee Abraham NP.    Specialty:  Gastroenterology  Why:  April 16, 2018 at 7:45 am   Contact information:  [address] [facility] [id number] [phone]             [facility] In 4 weeks.    Specialty:  Cardiology  Contact information:  [address] [facility] [id number] [phone]               Patient Instructions:     Ambulatory consult to Cardiology   Referral Priority:  Routine Referral Type: Consultation   Referral Reason: Specialty Services Required    Requested Specialty: Cardiology    Number of Visits Requested: 1      Activity as tolerated     Activity as tolerated         Significant Diagnostic Studies:   Imaging Results          MRI Brain Without Contrast (Final result)  Result time 04/13/18 13:47:41    Final result by Paresh Rosales MD (04/13/18 13:47:41)                 Impression:      1.  No evidence for acute CVA.  2.  No acute intracranial process.  3.  Mild chronic small vessel ischemic change of the white matter.      Electronically signed by: PARESH ROSALES MD  Date:     04/13/18  Time:    13:47              Narrative:    Head MRI without intravenous contrast    Indication: Syncope.    Technique: Multiplanar multisequence imaging of the head is performed without intravenous contrast.    Findings: The diffusion scan shows no restricted diffusion.  Sagittal sequence shows normal midline structures.  No mass lesion or extra-axial fluid collection or cerebral edema.  There is mild chronic small vessel ischemic change the white matter.  No midline shift or hydrocephalus.  Normal arterial flow voids are seen.                             US Carotid Bilateral (Final result)  Result time 04/13/18 12:21:09    Final result by Rocael Malik MD (04/13/18 12:21:09)                 Impression:      Elevated velocity within the left proximal common carotid artery.  percent stenosis in this region 50-69%..    Minimal plaque demonstrated within bilateral proximal internal carotid arteries and the left common carotid artery.  Percent stenosis within these regions less than 50%.    Stenosis of 50-69% - validated velocity measurements with angiographic measurements, velocity criteria are extrapolated from diameter data as defined by the Society of Radiologists in Ultrasound Consensus Conference Radiology 2003; 229;340-346.      Electronically signed by: ROCAEL MALIK MD  Date:      04/13/18  Time:    12:21              Narrative:    EXAM: Carotid arterial ultrasound    CLINICAL HISTORY: Syncope.  Unspecified syncope type.    Comparison: None.    FINDINGS:  Grey scale sonography and color doppler and spectral imaging performed of the cervical portions of the carotid arteries.    Right Side:      Velocities: Non-elevated velocities..  ICA/CCA Ratios: systolic 0.6. diastolic 0.9.  Plaque: Minimal plaque right proximal internal carotid artery..  Flow: Antegrade.    Right Vertebral artery: antegrade flow.    Left Side:    Velocities: Elevated velocity proximal common carotid artery 156/12 cm/s.  Otherwise non-elevated velocities..  ICA/CCA Ratios: systolic 0.3. diastolic 1.4.  Plaque: Minimal plaque common carotid artery and proximal ICA..  Flow: antegrade.    Left Vertebral Artery: antegrade flow.                             CT Abdomen Pelvis With Contrast (Final result)  Result time 04/13/18 10:15:08    Final result by Teja Merrill MD (04/13/18 10:15:08)                 Impression:        Thickening and pericolonic inflammatory changes are seen throughout the transverse and descending colon consistent with infectious or inflammatory colitis.     Mild mucosal hyperemia seen within fluid-filled nondistended ileal loops which can also be seen with enteritis.     All CT scans at this facility use dose modulation, iterative reconstruction and/or weight based dosing when appropriate to reduce radiation dose to as low as reasonably achievable.      Electronically signed by: TEJA MERRILL MD  Date:     04/13/18  Time:    10:15              Narrative:    Clinical data: Abdominal pain.    Axial images through the abdomen and pelvis were obtained  after administration of 75 cc of omni-350 contrast. Coronal and sagittal reformatted images were also submitted for interpretation.    Findings:    The visualized portion of the heart is normal.  No pericardial effusion. The lung bases are clear.  No  pleural effusion.    The liver, gallbladder, biliary system, pancreas, stomach, spleen, adrenal glands are normal.The aorta demonstrates no significant atherosclerotic plaque.No lymphadenopathy.    The kidneys demonstrate normal size, position, contrast excretion with no focal abnormalities or hydronephrosis.The bladder is unremarkable. .    The small bowel demonstrates no evidence of obstruction or ileus. Mild mucosal hyperemia seen within fluid-filled nondistended ileal loops which can also be seen with enteritis. Thickening and pericolonic inflammatory changes are seen throughout the transverse and descending colon consistent with colitis. No evidence of pneumatosis or free air.. The appendix is normal. Changes from ventral hernia repair are seen within the anterior abdominal wall.    Bones appear intact .                             X-Ray Chest AP Portable (Final result)  Result time 04/13/18 08:59:30    Final result by Teja Merrill MD (04/13/18 08:59:30)                 Impression:       No acute process seen.      Electronically signed by: TEJA MERRILL MD  Date:     04/13/18  Time:    08:59              Narrative:    Clinical Data:Syncope    Comparison:  none    Findings:  Single view of the chest.      Cardiac silhouette is normal.  The lungs demonstrate no evidence of active disease.  No evidence of pleural effusion or pneumothorax.  Bones appear intact.                              Pending Diagnostic Studies:     None         Medications:  Reconciled Home Medications:      Medication List      START taking these medications    ciprofloxacin HCl 500 MG tablet  Commonly known as:  CIPRO  Take 1 tablet (500 mg total) by mouth every 12 (twelve) hours.     metroNIDAZOLE 500 MG tablet  Commonly known as:  FLAGYL  Take 1 tablet (500 mg total) by mouth every 12 (twelve) hours.        CHANGE how you take these medications    traMADol 50 mg tablet  Commonly known as:  ULTRAM  Take 1 tablet (50 mg total) by mouth  every 6 (six) hours as needed for Pain.  What changed:  · when to take this  · reasons to take this        CONTINUE taking these medications    escitalopram oxalate 10 MG tablet  Commonly known as:  LEXAPRO  Take 10 mg by mouth once daily.     SYNTHROID 100 MCG tablet  Generic drug:  levothyroxine  Take 75 mcg by mouth.        STOP taking these medications    celecoxib 200 MG capsule  Commonly known as:  CeleBREX            Indwelling Lines/Drains at time of discharge:   Lines/Drains/Airways          No matching active lines, drains, or airways          Time spent on the discharge of patient: 42 minutes  Patient was seen and examined on the date of discharge and determined to be suitable for discharge.         Franecsca Sweeney NP  Department of Hospital Medicine  [facility]

## 2018-04-19 ENCOUNTER — TELEPHONE (OUTPATIENT)
Dept: GASTROENTEROLOGY | Facility: CLINIC | Age: 53
End: 2018-04-19

## 2018-04-19 NOTE — TELEPHONE ENCOUNTER
Pt requesting something for pain. Thought she could use her current Rx of tramadol. Tramadol can't be filled until 4/28/18 for insurance reasons.

## 2018-04-24 ENCOUNTER — ANESTHESIA (OUTPATIENT)
Dept: ENDOSCOPY | Facility: HOSPITAL | Age: 53
End: 2018-04-24
Payer: COMMERCIAL

## 2018-04-24 ENCOUNTER — HOSPITAL ENCOUNTER (OUTPATIENT)
Facility: HOSPITAL | Age: 53
Discharge: HOME | End: 2018-04-24
Attending: INTERNAL MEDICINE | Admitting: INTERNAL MEDICINE
Payer: COMMERCIAL

## 2018-04-24 ENCOUNTER — ANESTHESIA EVENT (OUTPATIENT)
Dept: ENDOSCOPY | Facility: HOSPITAL | Age: 53
End: 2018-04-24
Payer: COMMERCIAL

## 2018-04-24 ENCOUNTER — SURGERY (OUTPATIENT)
Age: 53
End: 2018-04-24

## 2018-04-24 DIAGNOSIS — R19.7 DIARRHEA: ICD-10-CM

## 2018-04-24 PROCEDURE — 45380 COLONOSCOPY AND BIOPSY: CPT | Mod: ,,, | Performed by: INTERNAL MEDICINE

## 2018-04-24 PROCEDURE — 88305 TISSUE EXAM BY PATHOLOGIST: CPT | Performed by: PATHOLOGY

## 2018-04-24 PROCEDURE — 45380 COLONOSCOPY AND BIOPSY: CPT | Performed by: INTERNAL MEDICINE

## 2018-04-24 PROCEDURE — 63600175 PHARM REV CODE 636 W HCPCS: Performed by: NURSE ANESTHETIST, CERTIFIED REGISTERED

## 2018-04-24 PROCEDURE — 37000008 HC ANESTHESIA 1ST 15 MINUTES: Performed by: INTERNAL MEDICINE

## 2018-04-24 PROCEDURE — 25000003 PHARM REV CODE 250: Performed by: INTERNAL MEDICINE

## 2018-04-24 PROCEDURE — 27201012 HC FORCEPS, HOT/COLD, DISP: Performed by: INTERNAL MEDICINE

## 2018-04-24 PROCEDURE — 88305 TISSUE EXAM BY PATHOLOGIST: CPT | Mod: 26,,, | Performed by: PATHOLOGY

## 2018-04-24 PROCEDURE — 37000009 HC ANESTHESIA EA ADD 15 MINS: Performed by: INTERNAL MEDICINE

## 2018-04-24 PROCEDURE — 25000003 PHARM REV CODE 250: Performed by: NURSE ANESTHETIST, CERTIFIED REGISTERED

## 2018-04-24 RX ORDER — SODIUM CHLORIDE, SODIUM LACTATE, POTASSIUM CHLORIDE, CALCIUM CHLORIDE 600; 310; 30; 20 MG/100ML; MG/100ML; MG/100ML; MG/100ML
INJECTION, SOLUTION INTRAVENOUS CONTINUOUS PRN
Status: DISCONTINUED | OUTPATIENT
Start: 2018-04-24 | End: 2018-04-24

## 2018-04-24 RX ORDER — SODIUM CHLORIDE, SODIUM LACTATE, POTASSIUM CHLORIDE, CALCIUM CHLORIDE 600; 310; 30; 20 MG/100ML; MG/100ML; MG/100ML; MG/100ML
INJECTION, SOLUTION INTRAVENOUS CONTINUOUS
Status: DISCONTINUED | OUTPATIENT
Start: 2018-04-24 | End: 2018-04-24 | Stop reason: HOSPADM

## 2018-04-24 RX ORDER — LIDOCAINE HCL/PF 100 MG/5ML
SYRINGE (ML) INTRAVENOUS
Status: DISCONTINUED | OUTPATIENT
Start: 2018-04-24 | End: 2018-04-24

## 2018-04-24 RX ORDER — PROPOFOL 10 MG/ML
INJECTION, EMULSION INTRAVENOUS
Status: DISCONTINUED | OUTPATIENT
Start: 2018-04-24 | End: 2018-04-24

## 2018-04-24 RX ADMIN — PROPOFOL 50 MG: 10 INJECTION, EMULSION INTRAVENOUS at 07:04

## 2018-04-24 RX ADMIN — SODIUM CHLORIDE, SODIUM LACTATE, POTASSIUM CHLORIDE, AND CALCIUM CHLORIDE: .6; .31; .03; .02 INJECTION, SOLUTION INTRAVENOUS at 07:04

## 2018-04-24 RX ADMIN — PROPOFOL 140 MG: 10 INJECTION, EMULSION INTRAVENOUS at 07:04

## 2018-04-24 RX ADMIN — LIDOCAINE HYDROCHLORIDE 100 MG: 20 INJECTION, SOLUTION INTRAVENOUS at 07:04

## 2018-04-24 RX ADMIN — PROPOFOL 60 MG: 10 INJECTION, EMULSION INTRAVENOUS at 07:04

## 2018-04-24 RX ADMIN — SODIUM CHLORIDE, SODIUM LACTATE, POTASSIUM CHLORIDE, AND CALCIUM CHLORIDE: 600; 310; 30; 20 INJECTION, SOLUTION INTRAVENOUS at 07:04

## 2018-04-24 NOTE — PLAN OF CARE
MD at , speaking with pt and fmly, all questions answered, pt denies c/o pain, VSS, dc instructions reviewed, verbalized understanding, pt ok to dc to home when criteria met

## 2018-04-24 NOTE — DISCHARGE INSTRUCTIONS
Colonoscopy     A camera attached to a flexible tube with a viewing lens is used to take video pictures.     Colonoscopy is a test to view the inside of your lower digestive tract (colon and rectum). Sometimes it can show the last part of the small intestine (ileum). During the test, small pieces of tissue may be removed for testing. This is called a biopsy. Small growths, such as polyps, may also be removed.   Why is colonoscopy done?  The test is done to help look for colon cancer. And it can help find the source of abdominal pain, bleeding, and changes in bowel habits. It may be needed once a year, depending on factors such as your:  · Age  · Health history  · Family health history  · Symptoms  · Results from any prior colonoscopy  Risks and possible complications  These include:  · Bleeding               · A puncture or tear in the colon   · Risks of anesthesia  · A cancer lesion not being seen  Getting ready   To prepare for the test:  · Talk with your healthcare provider about the risks of the test (see below). Also ask your healthcare provider about alternatives to the test.  · Tell your healthcare provider about any medicines you take. Also tell him or her about any health conditions you may have.  · Make sure your rectum and colon are empty for the test. Follow the diet and bowel prep instructions exactly. If you dont, the test may need to be rescheduled.  · Plan for a friend or family member to drive you home after the test.     Colonoscopy provides an inside view of the entire colon.     You may discuss the results with your doctor right away or at a future visit.  During the test   The test is usually done in the hospital on an outpatient basis. This means you go home the same day. The procedure takes about 30 minutes. During that time:  · You are given relaxing (sedating) medicine through an IV line. You may be drowsy, or fully asleep.  · The healthcare provider will first give you a physical exam to  check for anal and rectal problems.  · Then the anus is lubricated and the scope inserted.  · If you are awake, you may have a feeling similar to needing to have a bowel movement. You may also feel pressure as air is pumped into the colon. Its OK to pass gas during the procedure.  · Biopsy, polyp removal, or other treatments may be done during the test.  After the test   You may have gas right after the test. It can help to try to pass it to help prevent later bloating. Your healthcare provider may discuss the results with you right away. Or you may need to schedule a follow-up visit to talk about the results. After the test, you can go back to your normal eating and other activities. You may be tired from the sedation and need to rest for a few hours.  Date Last Reviewed: 11/1/2016 © 2000-2017 [employer]. [address] [place] [address], [place] [address]. All rights reserved. This information is not intended as a substitute for professional medical care. Always follow your healthcare professional's instructions.

## 2018-04-24 NOTE — [facility] PATIENT INSTRUCTIONS
Discharge Summary/Instructions after an Endoscopic Procedure  Patient Name: Diane Laughlin  Patient MRN: [id number]  Patient YOB: 1965 Tuesday, April 24, 2018 Mel Barboza MD  RESTRICTIONS:  During your procedure today, you received medications for sedation.  These   medications may affect your judgment, balance and coordination.  Therefore,   for 24 hours, you have the following restrictions:   - DO NOT drive a car, operate machinery, make legal/financial decisions,   sign important papers or drink alcohol.    ACTIVITY:  The following day: return to full activity including work, except no heavy   lifting, straining or running for 3 days if polyps were removed.  DIET:  Eat and drink normally unless instructed otherwise.     TREATMENT FOR COMMON SIDE EFFECTS:  - Mild abdominal pain, nausea, belching, bloating or excessive gas:  rest,   eat lightly and use a heating pad.  - Sore Throat: treat with throat lozenges and/or gargle with warm salt   water.  - Because air was used during the procedure, expelling large amounts of air   from your rectum or belching is normal.  - If a bowel prep was taken, you may not have a bowel movement for 1-3 days.    This is normal.  SYMPTOMS TO WATCH FOR AND REPORT TO YOUR PHYSICIAN:  1. Abdominal pain or bloating, other than gas cramps.  2. Chest pain.  3. Back pain.  4. Signs of infection such as: chills or fever occurring within 24 hours   after the procedure.  5. Rectal bleeding, which would show as bright red, maroon, or black stools.   (A tablespoon of blood from the rectum is not serious, especially if   hemorrhoids are present.)  6. Vomiting.  7. Weakness or dizziness.  GO DIRECTLY TO THE NEAREST EMERGENCY ROOM IF YOU HAVE ANY OF THE FOLLOWING:      Difficulty breathing              Chills and/or fever over 101 F   Persistent vomiting and/or vomiting blood   Severe abdominal pain   Severe chest pain   Black, tarry stools   Bleeding- more than one  tablespoon   Any other symptom or condition that you feel may need urgent attention  Your doctor recommends these additional instructions:  If any biopsies were taken, your doctors clinic will contact you in 1 to 2   weeks with any results.  - Patient has a contact number available for emergencies.  The signs and   symptoms of potential delayed complications were discussed with the   patient.  Return to normal activities tomorrow.  Written discharge   instructions were provided to the patient.   - Resume previous diet.   - Continue present medications.   - Await pathology results.   - Repeat colonoscopy for surveillance based on pathology results.   - Return to primary care physician as previously scheduled.   - Return to GI clinic as previously scheduled.   - Discharge patient to home (with escort).  For questions, problems or results please call your physician Mel Barboza MD at Work:  [phone]  If you have any questions about the above instructions, call the GI   department at [phone] or call the endoscopy unit at [phone]   from 7am until 3 pm.  [facility], EMERGENCY ROOM PHONE NUMBER:   [phone]  IF A COMPLICATION OR EMERGENCY SITUATION ARISES AND YOU ARE UNABLE TO REACH   YOUR PHYSICIAN - GO DIRECTLY TO THE EMERGENCY ROOM.  I have read or have had read to me these discharge instructions for my   procedure and have received a written copy.  I understand these   instructions and will follow-up with my physician if I have any questions.     __________________________________       _____________________________________  Nurse Signature                                          Patient/Designated   Responsible Party Signature  Mel Barboza MD  4/24/2018 7:55:33 AM  This report has been verified and signed electronically.

## 2018-04-24 NOTE — ANESTHESIA RELEASE NOTE
"Anesthesia Release from PACU Note    Patient: Diane Laughlin    Procedure(s) Performed: Procedure(s) (LRB):  COLONOSCOPY (N/A)    Anesthesia type: MAC    Post pain: Adequate analgesia    Post assessment: no apparent anesthetic complications, tolerated procedure well and no evidence of recall    Last Vitals:   Visit Vitals  /60 (BP Location: Left arm, Patient Position: Lying)   Pulse 95   Temp 36.8 °C (98.2 °F) (Oral)   Resp 20   Ht 5' 1" (1.549 m)   Wt 73.9 kg (163 lb)   SpO2 97%   Breastfeeding? No   BMI 30.80 kg/m²       Post vital signs: stable    Level of consciousness: awake and alert     Nausea/Vomiting: no nausea/no vomiting    Complications: none    Airway Patency: patent    Respiratory: unassisted, spontaneous ventilation, room air    Cardiovascular: stable    Hydration: euvolemic  "

## 2018-04-24 NOTE — TRANSFER OF CARE
"Anesthesia Transfer of Care Note    Patient: Diane Laughlin    Procedure(s) Performed: Procedure(s) (LRB):  COLONOSCOPY (N/A)    Patient location: PACU    Anesthesia Type: MAC    Transport from OR: Transported from OR on room air with adequate spontaneous ventilation    Post pain: adequate analgesia    Post assessment: no apparent anesthetic complications    Post vital signs: stable    Level of consciousness: awake    Nausea/Vomiting: no nausea/vomiting    Complications: none    Transfer of care protocol was followed      Last vitals:   Visit Vitals  /60 (BP Location: Left arm, Patient Position: Lying)   Pulse 95   Temp 36.8 °C (98.2 °F) (Oral)   Resp 20   Ht 5' 1" (1.549 m)   Wt 73.9 kg (163 lb)   SpO2 97%   Breastfeeding? No   BMI 30.80 kg/m²     "

## 2018-04-24 NOTE — ANESTHESIA PREPROCEDURE EVALUATION
04/24/2018  Diane Laughlin is a 52 y.o., female.    Pre-op Assessment    I have reviewed the Patient Summary Reports.     I have reviewed the Nursing Notes.   I have reviewed the Medications.     Review of Systems  Anesthesia Hx:  No problems with previous Anesthesia  History of prior surgery of interest to airway management or planning: Previous anesthesia: General, MAC Denies Family Hx of Anesthesia complications.   Denies Personal Hx of Anesthesia complications.   Social:  Non-Smoker, No Alcohol Use    Hematology/Oncology:  Hematology Normal   Oncology Normal     EENT/Dental:EENT/Dental Normal   Cardiovascular:   Hypertension    Pulmonary:  Pulmonary Normal Hx of Bronchitis-  Over 4 mts ago   Renal/:  Renal/ Normal     Hepatic/GI:  Hepatic/GI Normal    Musculoskeletal:   Ruptured Lumbar disc - L3-S1 Spine Disorders: lumbar    OB/GYN/PEDS:  Hx of GDM - no residual issue   Neurological:  Neurology Normal    Endocrine:   Hyperthyroidism Thyroidectomy- over 1 yr ago   Psych:  Psychiatric Normal           Physical Exam  General:  Obesity    Airway/Jaw/Neck:  Airway Findings: Mouth Opening: Normal Tongue: Normal  General Airway Assessment: Adult  Mallampati: II  Improves to II with phonation.  TM Distance: Normal, at least 6 cm       Chest/Lungs:  Chest/Lungs Findings: Normal Respiratory Rate, Clear to auscultation     Heart/Vascular:  Heart Findings: Rate: Normal        Mental Status:  Mental Status Findings:  Cooperative, Alert and Oriented         Anesthesia Plan  Type of Anesthesia, risks & benefits discussed:  Anesthesia Type:  MAC  Patient's Preference:   Intra-op Monitoring Plan: standard ASA monitors  Intra-op Monitoring Plan Comments:   Post Op Pain Control Plan:   Post Op Pain Control Plan Comments:   Induction:   IV  Beta Blocker:  Patient is not currently on a Beta-Blocker (No further  documentation required).       Informed Consent: Patient understands risks and agrees with Anesthesia plan.  Questions answered. Anesthesia consent signed with patient.  ASA Score: 2     Day of Surgery Review of History & Physical: I have interviewed and examined the patient. I have reviewed the patient's H&P dated:

## 2018-04-24 NOTE — H&P (VIEW-ONLY)
Clinic Consult:  [facility] Consultation Note    Reason for Consult:  The primary encounter diagnosis was Diarrhea, unspecified type. Diagnoses of Colitis and RLQ abdominal pain were also pertinent to this visit.    PCP: Cesar Mccurdy   [address] [facility] / [place] [id number]    HPI:  This is a 52 y.o. female here for evaluation of the above. She is here for hospital follow up. She is known to the GI department form previous encounter but is new to me. She was recently hospitalized for observation over the weekend after being going to the ER post syncopal episode and rectal bleeding. She admits to having rectal bleeding with almost every bowel movement. Bleeding is dark to bright red blood. It is noticed in the toilet. Onset of this was between 9 months and 1 year ago. associated symptoms include abdominal pain and diarrhea. Diarrhea is intermittent and is alternated with constipation. Most of her problems are with constipation and only has a bowel movement every 1-2 weeks. Abdominal pain is located in the RLQ and worsens right before having a bowel movement and partially resolves after defecation. She also admits to having episodes of syncope or near-syncope. She starts with flushing and feeling hot. She will then have to have a bowel movement. Again, this is sometimes constipation or diarrhea. She has had a colonoscopy in her remote past for abdominal complaints. This was >10 years ago. She reports no polyps. She had a CT scan while hospitalized with showed some inflammation of the small bowel as well as the transverse and descending colon. She had an episode of rectal bleedintg at home since discharge. She was discharged on Cipro and Flagyl. No stool studies collected.     Review of Systems   Constitutional: Negative for fever, malaise/fatigue and weight loss.   HENT: Negative for sore throat.    Respiratory: Negative for cough and wheezing.    Cardiovascular: Negative for chest pain and  "palpitations.   Gastrointestinal: Positive for abdominal pain, blood in stool, constipation and diarrhea. Negative for heartburn, melena, nausea and vomiting.   Genitourinary: Negative for dysuria and frequency.   Musculoskeletal: Negative for back pain, joint pain, myalgias and neck pain.   Skin: Negative for itching and rash.   Neurological: Negative for dizziness, speech change, seizures, loss of consciousness and headaches.   Psychiatric/Behavioral: Negative for depression and substance abuse. The patient is not nervous/anxious.        Medical History:  has a past medical history of Breast cancer; Chronic back pain; Graves disease; and Hypertension.    Surgical History:  has a past surgical history that includes Hysterectomy; Knee surgery (Left); Foot surgery; and  section.    Family History: family history includes Heart disease in her father and mother..     Social History:  reports that she has never smoked. She has never used smokeless tobacco. She reports that she does not drink alcohol or use drugs.    Allergies: Reviewed    Home Medications:   Current Outpatient Prescriptions on File Prior to Visit   Medication Sig Dispense Refill    ciprofloxacin HCl (CIPRO) 500 MG tablet Take 1 tablet (500 mg total) by mouth every 12 (twelve) hours. 14 tablet 0    escitalopram oxalate (LEXAPRO) 10 MG tablet Take 10 mg by mouth once daily.      levothyroxine (SYNTHROID) 100 MCG tablet Take 75 mcg by mouth.       metroNIDAZOLE (FLAGYL) 500 MG tablet Take 1 tablet (500 mg total) by mouth every 12 (twelve) hours. 14 tablet 0    traMADol (ULTRAM) 50 mg tablet Take 1 tablet (50 mg total) by mouth every 6 (six) hours as needed for Pain. 15 tablet 0     No current facility-administered medications on file prior to visit.        Physical Exam:  BP (!) 152/92   Pulse 100   Ht 5' 1" (1.549 m)   Wt 75.4 kg (166 lb 3.6 oz)   BMI 31.41 kg/m²   Body mass index is 31.41 kg/m².  Physical Exam   Constitutional: She is " "oriented to person, place, and time and well-developed, well-nourished, and in no distress. No distress.   HENT:   Head: Normocephalic.   Eyes: Conjunctivae are normal. Pupils are equal, round, and reactive to light.   Cardiovascular: Normal rate, regular rhythm and normal heart sounds.    Pulmonary/Chest: Effort normal and breath sounds normal. No respiratory distress.   Abdominal: Soft. Bowel sounds are normal. She exhibits no distension. There is no tenderness.   Neurological: She is alert and oriented to person, place, and time. No cranial nerve deficit.   Skin: Skin is warm and dry. No rash noted.   Psychiatric: Mood and affect normal.       Labs: Pertinent labs reviewed.    Assessment:  1. Diarrhea, unspecified type    2. Colitis    3. RLQ abdominal pain         Recommendations:  - unclear cause of symptoms/abnormal Ct scan  - she was place on Cipro and Flagyl which should cover infectious etiology  - no plan for stool studies as she has been on antibiotics  - will need a colonoscopy for further evaluation.   - DDX include infectious colitis, IBD, colon cancer, and possible carcinoid tumor given the symptom "flushing" followed by bowel movement.  -     Case request GI: COLONOSCOPY  -     SUPREP BOWEL PREP KIT 17.5-3.13-1.6 gram SolR; Use as directed  Dispense: 354 mL; Refill: 0    Follow up to be determined after procedure.    Thank you so much for allowing me to participate in the care of KRISHNA Willis  "

## 2018-04-24 NOTE — ANESTHESIA POSTPROCEDURE EVALUATION
"Anesthesia Post Evaluation    Patient: Diane Laughlin    Procedure(s) Performed: Procedure(s) (LRB):  COLONOSCOPY (N/A)    Final Anesthesia Type: MAC  Patient location during evaluation: PACU  Patient participation: Yes- Able to Participate  Level of consciousness: awake and alert and oriented  Post-procedure vital signs: reviewed and stable  Pain management: adequate  Airway patency: patent  PONV status at discharge: No PONV  Anesthetic complications: no      Cardiovascular status: blood pressure returned to baseline  Respiratory status: unassisted, spontaneous ventilation and room air  Hydration status: euvolemic  Follow-up not needed.        Visit Vitals  /60 (BP Location: Left arm, Patient Position: Lying)   Pulse 95   Temp 36.8 °C (98.2 °F) (Oral)   Resp 20   Ht 5' 1" (1.549 m)   Wt 73.9 kg (163 lb)   SpO2 97%   Breastfeeding? No   BMI 30.80 kg/m²       Pain/Pierce Score: Pain Assessment Performed: Yes (4/24/2018  7:55 AM)  Presence of Pain: denies (4/24/2018  7:55 AM)  Pierce Score: 8 (4/24/2018  7:55 AM)      "

## 2018-04-25 VITALS
RESPIRATION RATE: 18 BRPM | TEMPERATURE: 98 F | BODY MASS INDEX: 30.78 KG/M2 | DIASTOLIC BLOOD PRESSURE: 68 MMHG | HEIGHT: 61 IN | OXYGEN SATURATION: 98 % | WEIGHT: 163 LBS | HEART RATE: 79 BPM | SYSTOLIC BLOOD PRESSURE: 122 MMHG

## 2018-05-01 ENCOUNTER — PATIENT MESSAGE (OUTPATIENT)
Dept: GASTROENTEROLOGY | Facility: CLINIC | Age: 53
End: 2018-05-01

## 2018-05-02 ENCOUNTER — PATIENT MESSAGE (OUTPATIENT)
Dept: NEPHROLOGY | Facility: CLINIC | Age: 53
End: 2018-05-02

## 2018-05-08 ENCOUNTER — LAB VISIT (OUTPATIENT)
Dept: LAB | Facility: HOSPITAL | Age: 53
End: 2018-05-08
Attending: INTERNAL MEDICINE
Payer: COMMERCIAL

## 2018-05-08 ENCOUNTER — OFFICE VISIT (OUTPATIENT)
Dept: GASTROENTEROLOGY | Facility: CLINIC | Age: 53
End: 2018-05-08
Payer: COMMERCIAL

## 2018-05-08 VITALS
HEIGHT: 61 IN | DIASTOLIC BLOOD PRESSURE: 74 MMHG | BODY MASS INDEX: 31.18 KG/M2 | SYSTOLIC BLOOD PRESSURE: 120 MMHG | WEIGHT: 165.13 LBS | HEART RATE: 64 BPM

## 2018-05-08 DIAGNOSIS — R11.10 VOMITING, INTRACTABILITY OF VOMITING NOT SPECIFIED, PRESENCE OF NAUSEA NOT SPECIFIED, UNSPECIFIED VOMITING TYPE: Primary | ICD-10-CM

## 2018-05-08 DIAGNOSIS — G89.29 CHRONIC RLQ PAIN: ICD-10-CM

## 2018-05-08 DIAGNOSIS — K59.00 CONSTIPATION, UNSPECIFIED CONSTIPATION TYPE: ICD-10-CM

## 2018-05-08 DIAGNOSIS — R11.10 VOMITING, INTRACTABILITY OF VOMITING NOT SPECIFIED, PRESENCE OF NAUSEA NOT SPECIFIED, UNSPECIFIED VOMITING TYPE: ICD-10-CM

## 2018-05-08 DIAGNOSIS — R10.31 CHRONIC RLQ PAIN: ICD-10-CM

## 2018-05-08 DIAGNOSIS — K63.3 COLON ULCER: ICD-10-CM

## 2018-05-08 LAB — IGA SERPL-MCNC: 78 MG/DL

## 2018-05-08 PROCEDURE — 82784 ASSAY IGA/IGD/IGG/IGM EACH: CPT

## 2018-05-08 PROCEDURE — 99214 OFFICE O/P EST MOD 30 MIN: CPT | Mod: S$GLB,,, | Performed by: INTERNAL MEDICINE

## 2018-05-08 PROCEDURE — 36415 COLL VENOUS BLD VENIPUNCTURE: CPT

## 2018-05-08 PROCEDURE — 3008F BODY MASS INDEX DOCD: CPT | Mod: CPTII,S$GLB,, | Performed by: INTERNAL MEDICINE

## 2018-05-08 PROCEDURE — 83516 IMMUNOASSAY NONANTIBODY: CPT

## 2018-05-08 PROCEDURE — 99999 PR PBB SHADOW E&M-EST. PATIENT-LVL III: CPT | Mod: PBBFAC,,, | Performed by: INTERNAL MEDICINE

## 2018-05-08 RX ORDER — CYANOCOBALAMIN 1000 UG/ML
1000 INJECTION, SOLUTION INTRAMUSCULAR; SUBCUTANEOUS
COMMUNITY

## 2018-05-08 RX ORDER — LEVOTHYROXINE SODIUM 75 UG/1
75 TABLET ORAL DAILY
COMMUNITY

## 2018-05-08 RX ORDER — PSEUDOEPHEDRINE HCL 120 MG/1
120 TABLET, FILM COATED, EXTENDED RELEASE ORAL
COMMUNITY

## 2018-05-08 NOTE — PROGRESS NOTES
Subjective:    PCP: Cesar Mccurdy MD    Referring physician: No ref. provider found      Patient ID: Diane Laughlin is a 52 y.o. female.    Chief Complaint: Abdominal Pain (c/o pain on right side) and Emesis (c/o vomiting every day)      HPI   Diane Laughlin is a 52 y.o. /White female here today for follow up of abdominal pain    Abdominal Pain  Patient complains of abdominal pain. The pain is located in the RLQ. The pain is described as cramping, and is 8/10 in intensity. Onset was several years ago. Symptoms have been unchanged since. Aggravating factors include none.  Alleviating factors include none. Associated symptoms include nausea, constipation and vomiting. Pain is constant and present day and night.    Nausea / Vomiting  Patient complains of nausea and vomiting. Onset of symptoms was several years ago. Patient describes nausea as moderate. Vomiting has occurred several times over the past several years. Vomitus is described as normal gastric contents.. Patient denies alcohol overuse, fever, hematemesis and melena. Symptoms have progressed to a point and plateaued.She had an EGD > 10 yrs ago that was normal.   She reports vomiting with or without meals. She has not lost weight with these symptoms. This occurs daily.    She had a recent hospitalization that with generalized abdominal pain, vomiting and rectal bleeding. A follow up colonoscopy showed ulcer around the splenic flexure (Acute) and a CT at time of admission showed ileitis. These symptoms and findings were acute in onset and different from her baseline symptoms. She was treated with antibiotics with resolution of these acute symptoms. She denies NSAID use.     She has chronic constipation. She has a bowel movement about once a week. She notices a bulge in her abdomen around umbilicus when she is prompted to have a bowel movement. She has not tried any therapy for constipation as she believes her symptoms are unrelated to her bowel  "pattern.    No family history of IBD or GI malignancy.     Last 3 sets of Vitals    Vitals - 1 value per visit 2018   SYSTOLIC 122 - 120   DIASTOLIC 68 - 74   PULSE 79 - 64   TEMPERATURE 98.2 - -   RESPIRATIONS 18 - -   SPO2 98 - -   Weight (lb) 163 - 165.12   Weight (kg) 73.936 - 74.9   HEIGHT 5' 1" - 5' 1"   BODY MASS INDEX 30.8 - 31.2   VISIT REPORT - - -   Pain Score  - 0 4       Past Medical History:   Diagnosis Date    Breast cancer     Chronic back pain     Graves disease     Hypertension        Past Surgical History:   Procedure Laterality Date    BREAST SURGERY      reconstruction     SECTION      with complication requiring large midline incision    COLONOSCOPY N/A 2018    Procedure: COLONOSCOPY;  Surgeon: Mel Barboza MD;  Location: [facility];  Service: Endoscopy;  Laterality: N/A;    FOOT SURGERY      HYSTERECTOMY      KNEE SURGERY Left     x 6    MASTECTOMY Bilateral     TOTAL THYROIDECTOMY         Family History   Problem Relation Age of Onset    Heart disease Mother     Heart disease Father        Social History     Social History    Marital status:      Spouse name: N/A    Number of children: N/A    Years of education: N/A     Social History Main Topics    Smoking status: Never Smoker    Smokeless tobacco: Never Used    Alcohol use No    Drug use: No    Sexual activity: Not Asked     Other Topics Concern    None     Social History Narrative    None       Review of patient's allergies indicates:   Allergen Reactions    Unclassified drug      "a chemo medicine'       Current Outpatient Prescriptions   Medication Sig Dispense Refill    cyanocobalamin 1,000 mcg/mL injection Inject 1,000 mcg into the muscle every 28 days.      ERGOCALCIFEROL, VITAMIN D2, (VITAMIN D ORAL) Take by mouth.      escitalopram oxalate (LEXAPRO) 10 MG tablet Take 10 mg by mouth once daily.      fish oil-omega-3 fatty acids 300-1,000 mg capsule Take " "by mouth once daily.      levothyroxine (SYNTHROID) 75 MCG tablet Take 75 mcg by mouth once daily.      lisinopril-hydrochlorothiazide (PRINZIDE,ZESTORETIC) 10-12.5 mg per tablet Take 1 tablet by mouth once daily.      pseudoephedrine (SUDAFED) 120 mg 12 hr tablet Take 120 mg by mouth every 12 (twelve) hours.      traMADol (ULTRAM) 50 mg tablet Take 1 tablet (50 mg total) by mouth every 6 (six) hours as needed for Pain. 15 tablet 0     No current facility-administered medications for this visit.        Review of Systems   Constitutional: Negative for activity change, appetite change, chills, diaphoresis, fatigue and fever.   HENT: Negative for congestion, ear pain, facial swelling, mouth sores, nosebleeds, rhinorrhea, sore throat and voice change.    Eyes: Negative for photophobia, pain, discharge, redness and visual disturbance.   Respiratory: Negative for apnea, cough, choking, chest tightness, shortness of breath and wheezing.    Cardiovascular: Negative for chest pain, palpitations and leg swelling.   Gastrointestinal:        As per HPI   Genitourinary: Negative for decreased urine volume, difficulty urinating, dysuria, frequency and hematuria.   Musculoskeletal: Positive for back pain. Negative for arthralgias, myalgias, neck pain and neck stiffness.   Skin: Negative for pallor and rash.   Neurological: Negative for tremors, seizures, syncope, weakness and headaches.   Hematological: Negative for adenopathy. Does not bruise/bleed easily.   Psychiatric/Behavioral: Negative for behavioral problems, confusion, hallucinations and sleep disturbance. The patient is nervous/anxious.        Objective:   /74   Pulse 64   Ht 5' 1" (1.549 m)   Wt 74.9 kg (165 lb 2 oz)   BMI 31.20 kg/m²   Wt Readings from Last 1 Encounters:   05/08/18 1501 74.9 kg (165 lb 2 oz)       Physical Exam   Constitutional: She is oriented to person, place, and time. She appears well-developed and well-nourished. No distress.   HENT: "   Head: Normocephalic and atraumatic.   Nose: Nose normal.   Mouth/Throat: Oropharynx is clear and moist.   Eyes: EOM are normal. Pupils are equal, round, and reactive to light. Right eye exhibits no discharge. Left eye exhibits no discharge. No scleral icterus.   Neck: Normal range of motion. Neck supple. No tracheal deviation present.   Cardiovascular: Normal rate, regular rhythm, normal heart sounds and intact distal pulses.  Exam reveals no gallop and no friction rub.    No murmur heard.  Pulmonary/Chest: Effort normal and breath sounds normal. No stridor. No respiratory distress. She has no wheezes. She has no rales. She exhibits no tenderness.   Abdominal: Soft. Bowel sounds are normal. She exhibits no distension and no mass. There is no tenderness. There is no rebound and no guarding. No hernia.   Musculoskeletal: Normal range of motion. She exhibits no edema or tenderness.   Lymphadenopathy:     She has no cervical adenopathy.   Neurological: She is alert and oriented to person, place, and time. She has normal reflexes.   Skin: Skin is warm and dry. She is not diaphoretic.   Psychiatric: She has a normal mood and affect. Her behavior is normal. Judgment and thought content normal.       Lab Results   Component Value Date    WBC 6.30 04/14/2018    HGB 12.0 04/14/2018    HCT 29.5 (L) 04/14/2018    MCV 92 04/14/2018     04/14/2018       Chemistry        Component Value Date/Time     04/14/2018 0441     04/14/2018 0441    K 5.1 04/14/2018 1725     04/14/2018 0441     04/14/2018 0441    CO2 24 04/14/2018 0441    CO2 24 04/14/2018 0441    BUN 5 (L) 04/14/2018 0441    BUN 5 (L) 04/14/2018 0441    CREATININE 0.8 04/14/2018 0441    CREATININE 0.8 04/14/2018 0441     04/14/2018 0441     04/14/2018 0441        Component Value Date/Time    CALCIUM 7.1 (L) 04/14/2018 0441    CALCIUM 7.1 (L) 04/14/2018 0441    ALKPHOS 57 04/14/2018 0441    AST 13 04/14/2018 0441    ALT 14  04/14/2018 0441    BILITOT 0.7 04/14/2018 0441    ESTGFRAFRICA >60 04/14/2018 0441    ESTGFRAFRICA >60 04/14/2018 0441    EGFRNONAA >60 04/14/2018 0441    EGFRNONAA >60 04/14/2018 0441          Lab Results   Component Value Date    APTT 22.8 04/13/2018     Lab Results   Component Value Date    INR 1.0 04/13/2018       No components found for: [id number]    No results found for: TSH  No results found for: HGBA1C    No results found for: AMYLASE  No results found for: LIPASE    No results found for: HAV, HEPAIGM, HEPBIGM, HEPBCAB, HBEAG, HEPCAB  No results found for: PPD    No results found for: OCCULTBLOOD    No results found for: IRON, TIBC, FERRITIN, SATURATEDIRO    Assessment:      1. Vomiting, intractability of vomiting not specified, presence of nausea not specified, unspecified vomiting type    2. Constipation, unspecified constipation type    3. Chronic RLQ pain    4. Colon ulcer         Plan:     Vomiting, intractability of vomiting not specified, presence of nausea not specified, unspecified vomiting type  -     Case request GI: ESOPHAGOGASTRODUODENOSCOPY (EGD)  -     TISSUE TRANSGLUTAMINASE (TTG), IGA; Future; Expected date: 05/08/2018  -     IgA; Future; Expected date: 05/08/2018   GES if EGD negative.    Constipation, unspecified constipation type   Advised to take Miralax with a goal of daily or every other day bowel movements and reassess frequency of abdominal pain and vomiting    Chronic RLQ pain   No findings on imaging or colonoscopy to account for pain   Discussed treating constipation and reassessing symptoms     Colon ulcer   Acute infection appears to be likely etiology given acute enteritis in association and responded to antibiotics. No signs of vascular compromise on CT and biopsies negative for ischemic changes or chronic changes like with Crohn's   Discussed repeating colonoscopy in 3 months to check for complete resolution.    If ulcers are persisting then would consider capsule endoscopy to  evaluate small bowel as well.       Follow-up in about 3 months (around 8/8/2018).      Mel Barboza MD

## 2018-05-10 LAB — TTG IGA SER IA-ACNC: 3 UNITS

## 2018-05-23 ENCOUNTER — SURGERY (OUTPATIENT)
Age: 53
End: 2018-05-23

## 2018-05-23 ENCOUNTER — ANESTHESIA (OUTPATIENT)
Dept: ENDOSCOPY | Facility: HOSPITAL | Age: 53
End: 2018-05-23
Payer: COMMERCIAL

## 2018-05-23 ENCOUNTER — ANESTHESIA EVENT (OUTPATIENT)
Dept: ENDOSCOPY | Facility: HOSPITAL | Age: 53
End: 2018-05-23
Payer: COMMERCIAL

## 2018-05-23 ENCOUNTER — HOSPITAL ENCOUNTER (OUTPATIENT)
Facility: HOSPITAL | Age: 53
Discharge: HOME | End: 2018-05-23
Attending: INTERNAL MEDICINE | Admitting: INTERNAL MEDICINE
Payer: COMMERCIAL

## 2018-05-23 VITALS
HEART RATE: 72 BPM | TEMPERATURE: 98 F | DIASTOLIC BLOOD PRESSURE: 65 MMHG | OXYGEN SATURATION: 97 % | SYSTOLIC BLOOD PRESSURE: 106 MMHG | RESPIRATION RATE: 18 BRPM

## 2018-05-23 DIAGNOSIS — R11.10 VOMITING: ICD-10-CM

## 2018-05-23 PROCEDURE — 25000003 PHARM REV CODE 250: Performed by: NURSE ANESTHETIST, CERTIFIED REGISTERED

## 2018-05-23 PROCEDURE — 63600175 PHARM REV CODE 636 W HCPCS: Performed by: NURSE ANESTHETIST, CERTIFIED REGISTERED

## 2018-05-23 PROCEDURE — 88305 TISSUE EXAM BY PATHOLOGIST: CPT | Mod: 26,,, | Performed by: PATHOLOGY

## 2018-05-23 PROCEDURE — 88305 TISSUE EXAM BY PATHOLOGIST: CPT | Performed by: PATHOLOGY

## 2018-05-23 PROCEDURE — 43239 EGD BIOPSY SINGLE/MULTIPLE: CPT | Mod: ,,, | Performed by: INTERNAL MEDICINE

## 2018-05-23 PROCEDURE — 27201012 HC FORCEPS, HOT/COLD, DISP: Performed by: INTERNAL MEDICINE

## 2018-05-23 PROCEDURE — 25000003 PHARM REV CODE 250: Performed by: INTERNAL MEDICINE

## 2018-05-23 PROCEDURE — 43239 EGD BIOPSY SINGLE/MULTIPLE: CPT | Performed by: INTERNAL MEDICINE

## 2018-05-23 PROCEDURE — 37000008 HC ANESTHESIA 1ST 15 MINUTES: Performed by: INTERNAL MEDICINE

## 2018-05-23 RX ORDER — PANTOPRAZOLE SODIUM 40 MG/1
40 TABLET, DELAYED RELEASE ORAL
Qty: 60 TABLET | Refills: 1 | Status: SHIPPED | OUTPATIENT
Start: 2018-05-23 | End: 2018-05-31

## 2018-05-23 RX ORDER — SODIUM CHLORIDE, SODIUM LACTATE, POTASSIUM CHLORIDE, CALCIUM CHLORIDE 600; 310; 30; 20 MG/100ML; MG/100ML; MG/100ML; MG/100ML
INJECTION, SOLUTION INTRAVENOUS CONTINUOUS
Status: DISCONTINUED | OUTPATIENT
Start: 2018-05-23 | End: 2018-05-23 | Stop reason: HOSPADM

## 2018-05-23 RX ORDER — PROPOFOL 10 MG/ML
VIAL (ML) INTRAVENOUS
Status: DISCONTINUED | OUTPATIENT
Start: 2018-05-23 | End: 2018-05-23

## 2018-05-23 RX ORDER — LIDOCAINE HYDROCHLORIDE 20 MG/ML
INJECTION, SOLUTION EPIDURAL; INFILTRATION; INTRACAUDAL; PERINEURAL
Status: DISCONTINUED | OUTPATIENT
Start: 2018-05-23 | End: 2018-05-23

## 2018-05-23 RX ADMIN — PROPOFOL 50 MG: 10 INJECTION, EMULSION INTRAVENOUS at 02:05

## 2018-05-23 RX ADMIN — PROPOFOL 25 MG: 10 INJECTION, EMULSION INTRAVENOUS at 02:05

## 2018-05-23 RX ADMIN — SODIUM CHLORIDE, SODIUM LACTATE, POTASSIUM CHLORIDE, AND CALCIUM CHLORIDE: .6; .31; .03; .02 INJECTION, SOLUTION INTRAVENOUS at 01:05

## 2018-05-23 RX ADMIN — LIDOCAINE HYDROCHLORIDE 40 MG: 20 INJECTION, SOLUTION EPIDURAL; INFILTRATION; INTRACAUDAL; PERINEURAL at 02:05

## 2018-05-23 NOTE — ANESTHESIA PREPROCEDURE EVALUATION
05/23/2018  Diane Laughlin is a 52 y.o., female.    Anesthesia Evaluation    I have reviewed the Patient Summary Reports.    I have reviewed the Nursing Notes.   I have reviewed the Medications.     Review of Systems  Anesthesia Hx:  No problems with previous Anesthesia    Social:  Non-Smoker    Cardiovascular:   Hypertension  Hypertension, Essential Hypertension    Pulmonary:  Pulmonary Normal    Hepatic/GI:  Hepatic/GI Symptoms: vomiting.    Endocrine:   Hyperthyroidism  Thyroid Disease, Graves Disease, Hyperthyroidism  Metabolic Disorders, Obesity / BMI > 30      Physical Exam  General:  Obesity    Airway/Jaw/Neck:  Airway Findings: Mouth Opening: Normal Tongue: Normal  General Airway Assessment: Adult       Chest/Lungs:  Chest/Lungs Findings: Normal Respiratory Rate     Heart/Vascular:  Heart Findings: Rate: Normal             Anesthesia Plan  Type of Anesthesia, risks & benefits discussed:  Anesthesia Type:  MAC  Patient's Preference:   Intra-op Monitoring Plan: standard ASA monitors  Intra-op Monitoring Plan Comments:   Post Op Pain Control Plan:   Post Op Pain Control Plan Comments:   Induction:   IV  Beta Blocker:  Patient is not currently on a Beta-Blocker (No further documentation required).       Informed Consent: Patient understands risks and agrees with Anesthesia plan.  Questions answered. Anesthesia consent signed with patient.  ASA Score: 2     Day of Surgery Review of History & Physical: I have interviewed and examined the patient. I have reviewed the patient's H&P dated:  There are no significant changes.          Ready For Surgery From Anesthesia Perspective.

## 2018-05-23 NOTE — [employer] PATIENT INSTRUCTIONS
Discharge Summary/Instructions after an Endoscopic Procedure  Patient Name: Diane Laughlin  Patient MRN: [id number]  Patient YOB: 1965  Wednesday, May 23, 2018 Mel Barboza MD  RESTRICTIONS:  During your procedure today, you received medications for sedation.  These   medications may affect your judgment, balance and coordination.  Therefore,   for 24 hours, you have the following restrictions:   - DO NOT drive a car, operate machinery, make legal/financial decisions,   sign important papers or drink alcohol.    ACTIVITY:  The following day: return to full activity including work, except no heavy   lifting, straining or running for 3 days if polyps were removed.  DIET:  Eat and drink normally unless instructed otherwise.     TREATMENT FOR COMMON SIDE EFFECTS:  - Mild abdominal pain, nausea, belching, bloating or excessive gas:  rest,   eat lightly and use a heating pad.  - Sore Throat: treat with throat lozenges and/or gargle with warm salt   water.  - Because air was used during the procedure, expelling large amounts of air   from your rectum or belching is normal.  - If a bowel prep was taken, you may not have a bowel movement for 1-3 days.    This is normal.  SYMPTOMS TO WATCH FOR AND REPORT TO YOUR PHYSICIAN:  1. Abdominal pain or bloating, other than gas cramps.  2. Chest pain.  3. Back pain.  4. Signs of infection such as: chills or fever occurring within 24 hours   after the procedure.  5. Rectal bleeding, which would show as bright red, maroon, or black stools.   (A tablespoon of blood from the rectum is not serious, especially if   hemorrhoids are present.)  6. Vomiting.  7. Weakness or dizziness.  GO DIRECTLY TO THE NEAREST EMERGENCY ROOM IF YOU HAVE ANY OF THE FOLLOWING:      Difficulty breathing              Chills and/or fever over 101 F   Persistent vomiting and/or vomiting blood   Severe abdominal pain   Severe chest pain   Black, tarry stools   Bleeding- more than one  tablespoon   Any other symptom or condition that you feel may need urgent attention  Your doctor recommends these additional instructions:  If any biopsies were taken, your doctors clinic will contact you in 1 to 2   weeks with any results.  - Patient has a contact number available for emergencies.  The signs and   symptoms of potential delayed complications were discussed with the   patient.  Return to normal activities tomorrow.  Written discharge   instructions were provided to the patient.   - Resume previous diet.   - Continue present medications.   - Await pathology results.   - Use Protonix (pantoprazole) 40 mg PO BID for 6 weeks.   - Return to GI clinic.   - Discharge patient to home (with escort).  For questions, problems or results please call your physician Mel Barboza MD at Work:  [phone]  If you have any questions about the above instructions, call the GI   department at [phone] or call the endoscopy unit at [phone]   from 7am until 3 pm.  [facility] [place], EMERGENCY ROOM PHONE NUMBER:   [phone]  IF A COMPLICATION OR EMERGENCY SITUATION ARISES AND YOU ARE UNABLE TO REACH   YOUR PHYSICIAN - GO DIRECTLY TO THE EMERGENCY ROOM.  I have read or have had read to me these discharge instructions for my   procedure and have received a written copy.  I understand these   instructions and will follow-up with my physician if I have any questions.     __________________________________       _____________________________________  Nurse Signature                                          Patient/Designated   Responsible Party Signature  Mel Barboza MD  5/23/2018 2:30:36 PM  This report has been verified and signed electronically.  [employer]

## 2018-05-23 NOTE — H&P (VIEW-ONLY)
Subjective:    PCP: Cesar Mccurdy MD    Referring physician: No ref. provider found      Patient ID: Diane Laughlin is a 52 y.o. female.    Chief Complaint: Abdominal Pain (c/o pain on right side) and Emesis (c/o vomiting every day)      HPI   Diane Laughlin is a 52 y.o. /White female here today for follow up of abdominal pain    Abdominal Pain  Patient complains of abdominal pain. The pain is located in the RLQ. The pain is described as cramping, and is 8/10 in intensity. Onset was several years ago. Symptoms have been unchanged since. Aggravating factors include none.  Alleviating factors include none. Associated symptoms include nausea, constipation and vomiting. Pain is constant and present day and night.    Nausea / Vomiting  Patient complains of nausea and vomiting. Onset of symptoms was several years ago. Patient describes nausea as moderate. Vomiting has occurred several times over the past several years. Vomitus is described as normal gastric contents.. Patient denies alcohol overuse, fever, hematemesis and melena. Symptoms have progressed to a point and plateaued.She had an EGD > 10 yrs ago that was normal.   She reports vomiting with or without meals. She has not lost weight with these symptoms. This occurs daily.    She had a recent hospitalization that with generalized abdominal pain, vomiting and rectal bleeding. A follow up colonoscopy showed ulcer around the splenic flexure (Acute) and a CT at time of admission showed ileitis. These symptoms and findings were acute in onset and different from her baseline symptoms. She was treated with antibiotics with resolution of these acute symptoms. She denies NSAID use.     She has chronic constipation. She has a bowel movement about once a week. She notices a bulge in her abdomen around umbilicus when she is prompted to have a bowel movement. She has not tried any therapy for constipation as she believes her symptoms are unrelated to her bowel  "pattern.    No family history of IBD or GI malignancy.     Last 3 sets of Vitals    Vitals - 1 value per visit 2018   SYSTOLIC 122 - 120   DIASTOLIC 68 - 74   PULSE 79 - 64   TEMPERATURE 98.2 - -   RESPIRATIONS 18 - -   SPO2 98 - -   Weight (lb) 163 - 165.12   Weight (kg) 73.936 - 74.9   HEIGHT 5' 1" - 5' 1"   BODY MASS INDEX 30.8 - 31.2   VISIT REPORT - - -   Pain Score  - 0 4       Past Medical History:   Diagnosis Date    Breast cancer     Chronic back pain     Graves disease     Hypertension        Past Surgical History:   Procedure Laterality Date    BREAST SURGERY      reconstruction     SECTION      with complication requiring large midline incision    COLONOSCOPY N/A 2018    Procedure: COLONOSCOPY;  Surgeon: Mel Barboza MD;  Location[facility];  Service: Endoscopy;  Laterality: N/A;    FOOT SURGERY      HYSTERECTOMY      KNEE SURGERY Left     x 6    MASTECTOMY Bilateral     TOTAL THYROIDECTOMY         Family History   Problem Relation Age of Onset    Heart disease Mother     Heart disease Father        Social History     Social History    Marital status:      Spouse name: N/A    Number of children: N/A    Years of education: N/A     Social History Main Topics    Smoking status: Never Smoker    Smokeless tobacco: Never Used    Alcohol use No    Drug use: No    Sexual activity: Not Asked     Other Topics Concern    None     Social History Narrative    None       Review of patient's allergies indicates:   Allergen Reactions    Unclassified drug      "a chemo medicine'       Current Outpatient Prescriptions   Medication Sig Dispense Refill    cyanocobalamin 1,000 mcg/mL injection Inject 1,000 mcg into the muscle every 28 days.      ERGOCALCIFEROL, VITAMIN D2, (VITAMIN D ORAL) Take by mouth.      escitalopram oxalate (LEXAPRO) 10 MG tablet Take 10 mg by mouth once daily.      fish oil-omega-3 fatty acids 300-1,000 mg capsule Take " "by mouth once daily.      levothyroxine (SYNTHROID) 75 MCG tablet Take 75 mcg by mouth once daily.      lisinopril-hydrochlorothiazide (PRINZIDE,ZESTORETIC) 10-12.5 mg per tablet Take 1 tablet by mouth once daily.      pseudoephedrine (SUDAFED) 120 mg 12 hr tablet Take 120 mg by mouth every 12 (twelve) hours.      traMADol (ULTRAM) 50 mg tablet Take 1 tablet (50 mg total) by mouth every 6 (six) hours as needed for Pain. 15 tablet 0     No current facility-administered medications for this visit.        Review of Systems   Constitutional: Negative for activity change, appetite change, chills, diaphoresis, fatigue and fever.   HENT: Negative for congestion, ear pain, facial swelling, mouth sores, nosebleeds, rhinorrhea, sore throat and voice change.    Eyes: Negative for photophobia, pain, discharge, redness and visual disturbance.   Respiratory: Negative for apnea, cough, choking, chest tightness, shortness of breath and wheezing.    Cardiovascular: Negative for chest pain, palpitations and leg swelling.   Gastrointestinal:        As per HPI   Genitourinary: Negative for decreased urine volume, difficulty urinating, dysuria, frequency and hematuria.   Musculoskeletal: Positive for back pain. Negative for arthralgias, myalgias, neck pain and neck stiffness.   Skin: Negative for pallor and rash.   Neurological: Negative for tremors, seizures, syncope, weakness and headaches.   Hematological: Negative for adenopathy. Does not bruise/bleed easily.   Psychiatric/Behavioral: Negative for behavioral problems, confusion, hallucinations and sleep disturbance. The patient is nervous/anxious.        Objective:   /74   Pulse 64   Ht 5' 1" (1.549 m)   Wt 74.9 kg (165 lb 2 oz)   BMI 31.20 kg/m²   Wt Readings from Last 1 Encounters:   05/08/18 1501 74.9 kg (165 lb 2 oz)       Physical Exam   Constitutional: She is oriented to person, place, and time. She appears well-developed and well-nourished. No distress.   HENT: "   Head: Normocephalic and atraumatic.   Nose: Nose normal.   Mouth/Throat: Oropharynx is clear and moist.   Eyes: EOM are normal. Pupils are equal, round, and reactive to light. Right eye exhibits no discharge. Left eye exhibits no discharge. No scleral icterus.   Neck: Normal range of motion. Neck supple. No tracheal deviation present.   Cardiovascular: Normal rate, regular rhythm, normal heart sounds and intact distal pulses.  Exam reveals no gallop and no friction rub.    No murmur heard.  Pulmonary/Chest: Effort normal and breath sounds normal. No stridor. No respiratory distress. She has no wheezes. She has no rales. She exhibits no tenderness.   Abdominal: Soft. Bowel sounds are normal. She exhibits no distension and no mass. There is no tenderness. There is no rebound and no guarding. No hernia.   Musculoskeletal: Normal range of motion. She exhibits no edema or tenderness.   Lymphadenopathy:     She has no cervical adenopathy.   Neurological: She is alert and oriented to person, place, and time. She has normal reflexes.   Skin: Skin is warm and dry. She is not diaphoretic.   Psychiatric: She has a normal mood and affect. Her behavior is normal. Judgment and thought content normal.       Lab Results   Component Value Date    WBC 6.30 04/14/2018    HGB 12.0 04/14/2018    HCT 29.5 (L) 04/14/2018    MCV 92 04/14/2018     04/14/2018       Chemistry        Component Value Date/Time     04/14/2018 0441     04/14/2018 0441    K 5.1 04/14/2018 1725     04/14/2018 0441     04/14/2018 0441    CO2 24 04/14/2018 0441    CO2 24 04/14/2018 0441    BUN 5 (L) 04/14/2018 0441    BUN 5 (L) 04/14/2018 0441    CREATININE 0.8 04/14/2018 0441    CREATININE 0.8 04/14/2018 0441     04/14/2018 0441     04/14/2018 0441        Component Value Date/Time    CALCIUM 7.1 (L) 04/14/2018 0441    CALCIUM 7.1 (L) 04/14/2018 0441    ALKPHOS 57 04/14/2018 0441    AST 13 04/14/2018 0441    ALT 14  04/14/2018 0441    BILITOT 0.7 04/14/2018 0441    ESTGFRAFRICA >60 04/14/2018 0441    ESTGFRAFRICA >60 04/14/2018 0441    EGFRNONAA >60 04/14/2018 0441    EGFRNONAA >60 04/14/2018 0441          Lab Results   Component Value Date    APTT 22.8 04/13/2018     Lab Results   Component Value Date    INR 1.0 04/13/2018       No components found for: [id number]    No results found for: TSH  No results found for: HGBA1C    No results found for: AMYLASE  No results found for: LIPASE    No results found for: HAV, HEPAIGM, HEPBIGM, HEPBCAB, HBEAG, HEPCAB  No results found for: PPD    No results found for: OCCULTBLOOD    No results found for: IRON, TIBC, FERRITIN, SATURATEDIRO    Assessment:      1. Vomiting, intractability of vomiting not specified, presence of nausea not specified, unspecified vomiting type    2. Constipation, unspecified constipation type    3. Chronic RLQ pain    4. Colon ulcer         Plan:     Vomiting, intractability of vomiting not specified, presence of nausea not specified, unspecified vomiting type  -     Case request GI: ESOPHAGOGASTRODUODENOSCOPY (EGD)  -     TISSUE TRANSGLUTAMINASE (TTG), IGA; Future; Expected date: 05/08/2018  -     IgA; Future; Expected date: 05/08/2018   GES if EGD negative.    Constipation, unspecified constipation type   Advised to take Miralax with a goal of daily or every other day bowel movements and reassess frequency of abdominal pain and vomiting    Chronic RLQ pain   No findings on imaging or colonoscopy to account for pain   Discussed treating constipation and reassessing symptoms     Colon ulcer   Acute infection appears to be likely etiology given acute enteritis in association and responded to antibiotics. No signs of vascular compromise on CT and biopsies negative for ischemic changes or chronic changes like with Crohn's   Discussed repeating colonoscopy in 3 months to check for complete resolution.    If ulcers are persisting then would consider capsule endoscopy to  evaluate small bowel as well.       Follow-up in about 3 months (around 8/8/2018).      Mel Barboza MD

## 2018-05-23 NOTE — DISCHARGE INSTRUCTIONS
Gastritis (Adult)    Gastritis is inflammation and irritation of the stomach lining. It can be present for a short time (acute) or be long lasting (chronic). Gastritis is often caused by infection with bacteria called H pylori. More than a third of people in the [place] have this bacteria in their bodies. In many cases, H pylori causes no problems or symptoms. In some people, though, the infection irritates the stomach lining and causes gastritis. Other causes of stomach irritation include drinking alcohol or taking pain-relieving medicines called NSAIDs (such as aspirin or ibuprofen).   Symptoms of gastritis can include:  · Abdominal pain or bloating  · Loss of appetite  · Nausea or vomiting  · Vomiting blood or having black stools  · Feeling more tired than usual  An inflamed and irritated stomach lining is more likely to develop a sore called an ulcer. To help prevent this, gastritis should be treated.  Home care  If needed, medicines may be prescribed. If you have H pylori infection, treating it will likely relieve your symptoms. Other changes can help reduce stomach irritation and help it heal.  · If you have been prescribed medicines for H pylori infection, take them as directed. Take all of the medicine until it is finished or your healthcare provider tells you to stop, even if you feel better.  · Your healthcare provider may recommend avoiding NSAIDs. If you take daily aspirin for your heart or other medical reasons, do not stop without talking to your healthcare provider first.  · Avoid drinking alcohol.  · Stop smoking. Smoking can irritate the stomach and delay healing. As much as possible, stay away from second hand smoke.  Follow-up care  Follow up with your healthcare provider, or as advised by our staff. Testing may be needed to check for inflammation or an ulcer.  When to seek medical advice  Call your healthcare provider for any of the following:  · Stomach pain that gets worse or moves to the lower  right abdomen (appendix area)  · Chest pain that appears or gets worse, or spreads to the back, neck, shoulder, or arm  · Frequent vomiting (cant keep down liquids)  · Blood in the stool or vomit (red or black in color)  · Feeling weak or dizzy  · Fever of 100.4ºF (38ºC) or higher, or as directed by your healthcare provider  Date Last Reviewed: 6/22/2015 © 2000-2017 [employer] [address] [place] [address]. All rights reserved. This information is not intended as a substitute for professional medical care. Always follow your healthcare professional's instructions.        What Is a Hiatal Hernia?    Hiatal hernia is when the area where the stomach and esophagus meet bulges up through the diaphragm into the chest cavity. In some cases, part of the stomach may bulge above the diaphragm. Stomach acid may move up into the esophagus and cause symptoms. The symptoms are often blamed on gastroesophageal reflux disease (GERD). You may only know about the hernia when it shows up on an X-ray taken for other reasons.   What you may feel  The hiatus is a normal hole in the diaphragm. The esophagus passes through this hole and leads to the stomach. In some cases, part of the stomach may bulge above the diaphragm. This bulge is called a hernia. Stomach acid may move up into the esophagus and cause symptoms.  When you eat, the muscle at the hiatus relaxes to allow food to pass into the stomach. It tightens again to keep food and digestive acids in the stomach.  Many people with hiatal hernias have mild symptoms. You may notice the following GERD symptoms:  · Heartburn or other chest discomfort  · A feeling of chest fullness after a meal  · Frequent burping  · Acid taste in the mouth  · Trouble swallowing  Treating symptoms  If you have been diagnosed with hiatal hernia, these suggestions may help improve symptoms:  · Lose excess weight. Extra weight puts pressure on the stomach and esophagus.  · Dont  lie down after eating. Sit up for at least an hour after eating. Lying down after eating can increase symptoms.  · Avoid certain foods and drinks. These include fatty foods, chocolate, coffee, mint, and other foods that cause symptoms for you.  · Dont smoke or drink alcohol. These can worsen symptoms.  · Look at your medicines. Discuss your medicines with your healthcare provider. Many medicines can cause symptoms.  · Consider an antacid medicine. Ask your healthcare provider about over-the-counter and prescription medicines that may help.  · Ask about surgery, if needed. Surgery is a treatment choice for some people. Your healthcare provider can determine if surgery is an option for you.    Date Last Reviewed: 10/1/2016  © 4840-6238 [employer]. [address] [place] [address], [place] [address]. All rights reserved. This information is not intended as a substitute for professional medical care. Always follow your healthcare professional's instructions.

## 2018-05-23 NOTE — ANESTHESIA RELEASE NOTE
Anesthesia Release from PACU Note    Patient: Diane Laughlin    Procedure(s) Performed: Procedure(s) (LRB):  ESOPHAGOGASTRODUODENOSCOPY (EGD) (N/A)    Anesthesia type: MAC    Post pain: Adequate analgesia    Post assessment: no apparent anesthetic complications, tolerated procedure well and no evidence of recall    Last Vitals:   Visit Vitals  /65   Pulse 72   Temp 36.7 °C (98 °F) (Oral)   Resp 18   SpO2 97%   Breastfeeding? No       Post vital signs: stable    Level of consciousness: awake, alert  and oriented    Nausea/Vomiting: no nausea/no vomiting    Complications: none    Airway Patency: patent    Respiratory: unassisted, spontaneous ventilation, room air    Cardiovascular: stable and blood pressure at baseline    Hydration: euvolemic

## 2018-05-23 NOTE — INTERVAL H&P NOTE
The patient has been examined and the H&P has been reviewed:    I concur with the findings and no changes have occurred since H&P was written.    Anesthesia/Surgery risks, benefits and alternative options discussed and understood by patient/family.          Active Hospital Problems    Diagnosis  POA    Vomiting [R11.10]  Yes      Resolved Hospital Problems    Diagnosis Date Resolved POA   No resolved problems to display.

## 2018-05-23 NOTE — TRANSFER OF CARE
Anesthesia Transfer of Care Note    Patient: Diane Laughlin    Procedure(s) Performed: Procedure(s) (LRB):  ESOPHAGOGASTRODUODENOSCOPY (EGD) (N/A)    Patient location: GI    Anesthesia Type: MAC    Transport from OR: Transported from OR on room air with adequate spontaneous ventilation    Post pain: adequate analgesia    Post assessment: no apparent anesthetic complications    Post vital signs: stable    Level of consciousness: awake, alert and oriented    Nausea/Vomiting: no nausea/vomiting    Complications: none    Transfer of care protocol was followed      Last vitals:   Visit Vitals  Breastfeeding? No

## 2018-05-23 NOTE — ANESTHESIA POSTPROCEDURE EVALUATION
Anesthesia Post Evaluation    Patient: Diane Laughlin    Procedure(s) Performed: Procedure(s) (LRB):  ESOPHAGOGASTRODUODENOSCOPY (EGD) (N/A)    Final Anesthesia Type: MAC  Patient location during evaluation: GI PACU  Patient participation: Yes- Able to Participate  Level of consciousness: awake and alert and oriented  Post-procedure vital signs: reviewed and stable  Pain management: adequate  Airway patency: patent  PONV status at discharge: No PONV  Anesthetic complications: no      Cardiovascular status: blood pressure returned to baseline  Respiratory status: unassisted, room air and spontaneous ventilation  Hydration status: euvolemic  Follow-up not needed.        Visit Vitals  /65   Pulse 72   Temp 36.7 °C (98 °F) (Oral)   Resp 18   SpO2 97%   Breastfeeding? No       Pain/Pierce Score: Pain Assessment Performed: Yes (5/23/2018  2:49 PM)  Presence of Pain: denies (5/23/2018  2:49 PM)  Pierce Score: 10 (5/23/2018  2:49 PM)

## 2018-05-31 ENCOUNTER — TELEPHONE (OUTPATIENT)
Dept: GASTROENTEROLOGY | Facility: CLINIC | Age: 53
End: 2018-05-31

## 2018-05-31 RX ORDER — OMEPRAZOLE 40 MG/1
40 CAPSULE, DELAYED RELEASE ORAL
Qty: 60 CAPSULE | Refills: 1 | Status: SHIPPED | OUTPATIENT
Start: 2018-05-31 | End: 2019-05-31

## 2018-05-31 RX ORDER — ESOMEPRAZOLE MAGNESIUM 40 MG/1
40 CAPSULE, DELAYED RELEASE ORAL 2 TIMES DAILY
Qty: 60 CAPSULE | Refills: 1 | Status: SHIPPED | OUTPATIENT
Start: 2018-05-31 | End: 2018-05-31

## 2018-05-31 NOTE — TELEPHONE ENCOUNTER
We can try Nexium. Sent to [facility]. Pathology report from EGD on 5/23/18 showed mild chronic gastritis (inflammation). No infection.

## 2018-05-31 NOTE — TELEPHONE ENCOUNTER
----- Message from Thea Macario sent at 5/31/2018  9:39 AM CDT -----  Contact: pt  Please call pt @ [phone], pt states the medication that doctor prescribe cause nausea,dizzy, pt also need test result, pt stop taking meds, pt need to know what to do.

## 2018-05-31 NOTE — TELEPHONE ENCOUNTER
----- Message from Evette France LPN sent at 5/31/2018  2:11 PM CDT -----  Contact: [facility]/ [place]   Nexium not covered. Pharmacy requesting alternative.     ----- Message -----  From: Dena Rosales  Sent: 5/31/2018  11:38 AM  To: Jarad Fontanez Staff    Caller states the pt's medication is not covered by her insurance and request a different medication. Please call  ..[phone]

## 2018-05-31 NOTE — TELEPHONE ENCOUNTER
----- Message from Diamond Ramos sent at 5/31/2018 10:46 AM CDT -----  Contact: [phone]  Returning call, please call back at [phone].  Thx-AH

## 2018-05-31 NOTE — TELEPHONE ENCOUNTER
Returned call to pt who stated she is unable to take pantoprazole because it caused her to have nausea and headaches. She would like to know if there is something else she should take. She is also requesting results from 5/23/18. Please advise.